# Patient Record
Sex: FEMALE | Race: BLACK OR AFRICAN AMERICAN | Employment: FULL TIME | URBAN - METROPOLITAN AREA
[De-identification: names, ages, dates, MRNs, and addresses within clinical notes are randomized per-mention and may not be internally consistent; named-entity substitution may affect disease eponyms.]

---

## 2019-01-04 ENCOUNTER — OFFICE VISIT (OUTPATIENT)
Dept: INTERNAL MEDICINE CLINIC | Age: 44
End: 2019-01-04

## 2019-01-04 VITALS
BODY MASS INDEX: 50.02 KG/M2 | OXYGEN SATURATION: 97 % | RESPIRATION RATE: 18 BRPM | DIASTOLIC BLOOD PRESSURE: 65 MMHG | HEIGHT: 64 IN | HEART RATE: 90 BPM | SYSTOLIC BLOOD PRESSURE: 96 MMHG | WEIGHT: 293 LBS | TEMPERATURE: 98.5 F

## 2019-01-04 DIAGNOSIS — E28.2 PCOS (POLYCYSTIC OVARIAN SYNDROME): ICD-10-CM

## 2019-01-04 DIAGNOSIS — E78.5 HYPERLIPIDEMIA, UNSPECIFIED HYPERLIPIDEMIA TYPE: ICD-10-CM

## 2019-01-04 DIAGNOSIS — R73.01 IFG (IMPAIRED FASTING GLUCOSE): ICD-10-CM

## 2019-01-04 DIAGNOSIS — I10 ESSENTIAL HYPERTENSION: Primary | ICD-10-CM

## 2019-01-04 NOTE — PATIENT INSTRUCTIONS
DASH Diet: Care Instructions  Your Care Instructions    The DASH diet is an eating plan that can help lower your blood pressure. DASH stands for Dietary Approaches to Stop Hypertension. Hypertension is high blood pressure. The DASH diet focuses on eating foods that are high in calcium, potassium, and magnesium. These nutrients can lower blood pressure. The foods that are highest in these nutrients are fruits, vegetables, low-fat dairy products, nuts, seeds, and legumes. But taking calcium, potassium, and magnesium supplements instead of eating foods that are high in those nutrients does not have the same effect. The DASH diet also includes whole grains, fish, and poultry. The DASH diet is one of several lifestyle changes your doctor may recommend to lower your high blood pressure. Your doctor may also want you to decrease the amount of sodium in your diet. Lowering sodium while following the DASH diet can lower blood pressure even further than just the DASH diet alone. Follow-up care is a key part of your treatment and safety. Be sure to make and go to all appointments, and call your doctor if you are having problems. It's also a good idea to know your test results and keep a list of the medicines you take. How can you care for yourself at home? Following the DASH diet  · Eat 4 to 5 servings of fruit each day. A serving is 1 medium-sized piece of fruit, ½ cup chopped or canned fruit, 1/4 cup dried fruit, or 4 ounces (½ cup) of fruit juice. Choose fruit more often than fruit juice. · Eat 4 to 5 servings of vegetables each day. A serving is 1 cup of lettuce or raw leafy vegetables, ½ cup of chopped or cooked vegetables, or 4 ounces (½ cup) of vegetable juice. Choose vegetables more often than vegetable juice. · Get 2 to 3 servings of low-fat and fat-free dairy each day. A serving is 8 ounces of milk, 1 cup of yogurt, or 1 ½ ounces of cheese. · Eat 6 to 8 servings of grains each day.  A serving is 1 slice of bread, 1 ounce of dry cereal, or ½ cup of cooked rice, pasta, or cooked cereal. Try to choose whole-grain products as much as possible. · Limit lean meat, poultry, and fish to 2 servings each day. A serving is 3 ounces, about the size of a deck of cards. · Eat 4 to 5 servings of nuts, seeds, and legumes (cooked dried beans, lentils, and split peas) each week. A serving is 1/3 cup of nuts, 2 tablespoons of seeds, or ½ cup of cooked beans or peas. · Limit fats and oils to 2 to 3 servings each day. A serving is 1 teaspoon of vegetable oil or 2 tablespoons of salad dressing. · Limit sweets and added sugars to 5 servings or less a week. A serving is 1 tablespoon jelly or jam, ½ cup sorbet, or 1 cup of lemonade. · Eat less than 2,300 milligrams (mg) of sodium a day. If you limit your sodium to 1,500 mg a day, you can lower your blood pressure even more. Tips for success  · Start small. Do not try to make dramatic changes to your diet all at once. You might feel that you are missing out on your favorite foods and then be more likely to not follow the plan. Make small changes, and stick with them. Once those changes become habit, add a few more changes. · Try some of the following:  ? Make it a goal to eat a fruit or vegetable at every meal and at snacks. This will make it easy to get the recommended amount of fruits and vegetables each day. ? Try yogurt topped with fruit and nuts for a snack or healthy dessert. ? Add lettuce, tomato, cucumber, and onion to sandwiches. ? Combine a ready-made pizza crust with low-fat mozzarella cheese and lots of vegetable toppings. Try using tomatoes, squash, spinach, broccoli, carrots, cauliflower, and onions. ? Have a variety of cut-up vegetables with a low-fat dip as an appetizer instead of chips and dip. ? Sprinkle sunflower seeds or chopped almonds over salads. Or try adding chopped walnuts or almonds to cooked vegetables.   ? Try some vegetarian meals using beans and peas. Add garbanzo or kidney beans to salads. Make burritos and tacos with mashed perez beans or black beans. Where can you learn more? Go to http://edinson-willy.info/. Enter R029 in the search box to learn more about \"DASH Diet: Care Instructions. \"  Current as of: December 6, 2017  Content Version: 11.8  © 6179-4939 [employer] Care instructions adapted under license by [employer] (which disclaims liability or warranty for this information). If you have questions about a medical condition or this instruction, always ask your healthcare professional. [address] any warranty or liability for your use of this information.

## 2019-01-04 NOTE — PROGRESS NOTES
Subjective:   Patient is a 37y.o. year old female who presents for Hypertension and PCOS  Establishing care. 1.  HTN:  On Lotrel daily. Has been on that for a few years. 2.  Hyperlipidemia:  Was on simvastatin in the past and caused palpitations. 3.  Prediabetes:  Has been on metformin 500 mg daily. She's been on it for PCOS and metformin. 4.  Sensory changes in the hands and feet. Recently. She has gotten that over the last few years but worse over the holidays. Worried about her sugar. Wanted to find out if she has diabetes or worsening sugars. Doing a little better now. 5. PCOS:  Was seeing Ob/Gyn for that in the past.  One concern is excessive hair growth and has to shave for that.    6.  H/O fibroids: On norethindrone and doing well with that. Doesn't want to get surgery. Review of Systems   Constitutional: Negative. Respiratory: Negative. Cardiovascular: Negative. Skin:        Hair growth   Neurological: Positive for sensory change. All other systems reviewed and are negative. Current Outpatient Medications on File Prior to Visit   Medication Sig Dispense Refill    amLODIPine-benazepril (LOTREL) 10-20 mg per capsule TK 1 C PO QD  5    Cholecalciferol, Vitamin D3, 50,000 unit cap TK 1 C PO 1 TIME PER WEEK.  5    famotidine (PEPCID) 20 mg tablet TK 1 T PO Q 12 H  0    fluticasone (FLONASE) 50 mcg/actuation nasal spray SHAKE LQ AND U 1 SPR IEN QD  4    norethindrone acetate (AYGESTIN) 5 mg tablet TK 1 T PO BID  5    aspirin 81 mg tablet Take 81 mg by mouth.  metformin ER (GLUCOPHAGE XR) 750 mg tablet Take 500 mg by mouth daily. No current facility-administered medications on file prior to visit. Reviewed PmHx, RxHx, FmHx, SocHx, AllgHx and updated and dated in the chart.     Nurse notes were reviewed and are correct    Objective:     Vitals:    01/04/19 1603   BP: 96/65   Pulse: 90   Resp: 18   Temp: 98.5 °F (36.9 °C)   TempSrc: Oral SpO2: 97%   Weight: 312 lb (141.5 kg)   Height: 5' 4\" (1.626 m)     Physical Exam   Constitutional: She is oriented to person, place, and time. She appears well-developed and well-nourished. No distress. HENT:   Head: Normocephalic and atraumatic. Right Ear: External ear normal.   Left Ear: External ear normal.   Nose: Nose normal.   Mouth/Throat: Oropharynx is clear and moist.   Eyes: Conjunctivae and EOM are normal. Pupils are equal, round, and reactive to light. No scleral icterus. Neck: Normal range of motion. Neck supple. No tracheal deviation present. No thyromegaly present. Cardiovascular: Normal rate, regular rhythm, normal heart sounds and intact distal pulses. Exam reveals no gallop and no friction rub. No murmur heard. Pulmonary/Chest: Effort normal and breath sounds normal. She has no wheezes. She has no rales. Abdominal: Soft. Bowel sounds are normal. She exhibits no distension. There is no hepatosplenomegaly. There is no tenderness. Musculoskeletal: Normal range of motion. She exhibits no edema or tenderness. Lymphadenopathy:     She has no cervical adenopathy. Neurological: She is alert and oriented to person, place, and time. Skin: Skin is warm and dry. No rash noted. No pallor. Psychiatric: She has a normal mood and affect. Her behavior is normal. Judgment and thought content normal.   Nursing note and vitals reviewed. Assessment/ Plan:     Diagnoses and all orders for this visit:    1. Essential hypertension:  Well controlled. Continue current meds. 2. PCOS (polycystic ovarian syndrome): Will go back to see Ob/Gyn to discuss options for this. 3. IFG (impaired fasting glucose): She will come back next week and get a CPE with fasting labs. 4. Hyperlipidemia, unspecified hyperlipidemia type:  Can't take Zocor. Will check lipids with CPE       I have discussed the diagnosis with the patient and the intended plan as seen in the above orders.   The patient verbalized understanding and agrees with the plan. Follow-up Disposition:  Return in about 4 days (around 1/8/2019) for CPE, fasting labs.     Molly Amaro MD

## 2019-01-06 PROBLEM — E66.01 OBESITY, MORBID (HCC): Status: ACTIVE | Noted: 2019-01-06

## 2019-01-06 PROBLEM — E78.5 HYPERLIPIDEMIA: Status: ACTIVE | Noted: 2019-01-06

## 2019-01-08 ENCOUNTER — OFFICE VISIT (OUTPATIENT)
Dept: INTERNAL MEDICINE CLINIC | Age: 44
End: 2019-01-08

## 2019-01-08 VITALS
SYSTOLIC BLOOD PRESSURE: 93 MMHG | RESPIRATION RATE: 18 BRPM | TEMPERATURE: 98.6 F | BODY MASS INDEX: 50.02 KG/M2 | WEIGHT: 293 LBS | HEART RATE: 90 BPM | DIASTOLIC BLOOD PRESSURE: 63 MMHG | OXYGEN SATURATION: 98 % | HEIGHT: 64 IN

## 2019-01-08 DIAGNOSIS — I10 ESSENTIAL HYPERTENSION: ICD-10-CM

## 2019-01-08 DIAGNOSIS — R73.03 PREDIABETES: ICD-10-CM

## 2019-01-08 DIAGNOSIS — Z12.39 SCREENING FOR BREAST CANCER: ICD-10-CM

## 2019-01-08 DIAGNOSIS — Z00.00 ROUTINE GENERAL MEDICAL EXAMINATION AT A HEALTH CARE FACILITY: Primary | ICD-10-CM

## 2019-01-08 RX ORDER — AMLODIPINE AND BENAZEPRIL HYDROCHLORIDE 10; 20 MG/1; MG/1
1 CAPSULE ORAL DAILY
Qty: 90 CAP | Refills: 1 | Status: SHIPPED | OUTPATIENT
Start: 2019-01-08 | End: 2019-08-06 | Stop reason: SDUPTHER

## 2019-01-08 NOTE — PROGRESS NOTES
Chief Complaint Patient presents with  Physical  
  with labs 1. Have you been to the ER, urgent care clinic since your last visit? Hospitalized since your last visit? No 
 
2. Have you seen or consulted any other health care providers outside of the [address] [place] [address] since your last visit? Include any pap smears or colon screening.  No

## 2019-01-08 NOTE — PATIENT INSTRUCTIONS

## 2019-01-08 NOTE — PROGRESS NOTES
Subjective:  
Patient is a 37y.o. year old female who presents for Physical (with labs) CPE:  Fasting today. Has h/o chiari malformation. Has seen neuro. Saw Dr. Emmie Fuller in the past for this. Sore on left breast:  She has had to shave the top of her breasts because of hair growth. She noticed a sore over the weekend that looked like it was infected. She put topical abx on it and it's gotten better but she would like it checked out. Review of Systems Constitutional: Negative. HENT: Positive for congestion (uses flonase). Eyes: Negative. Respiratory:  
     Diagnosed with sleep apnea last year. Hasn't gotten CPAP yet Cardiovascular:  
     Has heart murmur. Has had stress test a few years ago. Was normal  
Gastrointestinal: Positive for heartburn. Hernia:  Diagnosed last year. Had CT in the ED. No symptoms Genitourinary:  
     PCOS Musculoskeletal: Negative. Skin:  
     Dry spots all over. Possible eczema. Had it for a few years. On back of neck, arms, legs, and face Has some issues on the left and right breasts. The left has a sore over the weekend. Has been shaving that area due to PCOS Neurological: Positive for sensory change. Psychiatric/Behavioral: The patient is nervous/anxious. HM:  Do for mammogram.  Wants to do it at [facility] 
 
Current Outpatient Medications on File Prior to Visit Medication Sig Dispense Refill  Cholecalciferol, Vitamin D3, 50,000 unit cap TK 1 C PO 1 TIME PER WEEK.  5  
 famotidine (PEPCID) 20 mg tablet TK 1 T PO Q 12 H  0  
 fluticasone (FLONASE) 50 mcg/actuation nasal spray SHAKE LQ AND U 1 SPR IEN QD  4  
 norethindrone acetate (AYGESTIN) 5 mg tablet TK 1 T PO BID  5  
 aspirin 81 mg tablet Take 81 mg by mouth.  metformin ER (GLUCOPHAGE XR) 750 mg tablet Take 500 mg by mouth daily. No current facility-administered medications on file prior to visit. Reviewed PmHx, RxHx, FmHx, SocHx, AllgHx and updated and dated in the chart. Nurse notes were reviewed and are correct Objective:  
 
Vitals:  
 01/08/19 4988 BP: 93/63 Pulse: 90 Resp: 18 Temp: 98.6 °F (37 °C) TempSrc: Oral  
SpO2: 98% Weight: 310 lb (140.6 kg) Height: 5' 4\" (1.626 m) Physical Exam  
Constitutional: She appears well-developed and well-nourished. No distress. HENT:  
Head: Normocephalic and atraumatic. Right Ear: External ear normal.  
Left Ear: External ear normal.  
Nose: Nose normal.  
Mouth/Throat: Oropharynx is clear and moist.  
Eyes: Conjunctivae are normal. Pupils are equal, round, and reactive to light. No scleral icterus. Neck: Normal range of motion. Neck supple. Carotid bruit is not present. No tracheal deviation present. No thyromegaly present. Cardiovascular: Normal rate, regular rhythm, normal heart sounds and intact distal pulses. Exam reveals no gallop and no friction rub. No murmur heard. Pulmonary/Chest: Effort normal and breath sounds normal. She has no wheezes. She has no rales. Right breast exhibits no inverted nipple, no mass, no nipple discharge and no tenderness. Left breast exhibits no inverted nipple, no mass, no nipple discharge and no tenderness. Abdominal: Soft. Bowel sounds are normal. She exhibits no distension. There is no hepatosplenomegaly. There is no tenderness. Musculoskeletal: Normal range of motion. She exhibits no edema or tenderness. Lymphadenopathy:  
  She has no cervical adenopathy. Skin: Skin is warm and dry. No rash noted. No pallor. Psychiatric: She has a normal mood and affect. Judgment normal.  
Nursing note and vitals reviewed. Assessment/ Plan:  
 
Diagnoses and all orders for this visit: 1. Routine general medical examination at a health care facility -     METABOLIC PANEL, COMPREHENSIVE; Future -     CBC WITH AUTOMATED DIFF; Future -     LIPID PANEL; Future -     URINALYSIS W/ RFLX MICROSCOPIC; Future 
-     TSH 3RD GENERATION; Future 
-     HEMOGLOBIN A1C WITH EAG; Future 2. Essential hypertension 
-     amLODIPine-benazepril (LOTREL) 10-20 mg per capsule; Take 1 Cap by mouth daily. 3. Prediabetes 
-     HEMOGLOBIN A1C WITH EAG; Future 4. Screening for breast cancer:  She's due for screening mammogram.  Will go ahead and order. Exam was neg except for the healing sore. I think it was from skin damage from shaving that got infected. -     SINCERE MAMMO BI SCREENING INCL CAD; Future I have discussed the diagnosis with the patient and the intended plan as seen in the above orders. The patient verbalized understanding and agrees with the plan. Follow-up Disposition: 
Return in about 6 months (around 7/8/2019) for HTN, hyperlipidemis.  
 
Patel Padilla MD

## 2019-01-09 LAB
ABSOLUTE BANDS, 67058: ABNORMAL
ABSOLUTE BLASTS: ABNORMAL
ABSOLUTE METAMYELOCYTES, 900360: ABNORMAL
ABSOLUTE MYELOCYTES: ABNORMAL
ABSOLUTE NRBC,ANRBC: ABNORMAL
ABSOLUTE PROMYELOCYTES: ABNORMAL
ALB/GLOBRATIO, 58C: 1.3 (CALC) (ref 1–2.5)
ALBUMIN SERPL-MCNC: 4.4 G/DL (ref 3.6–5.1)
ALP SERPL-CCNC: 86 U/L (ref 33–115)
ALT SERPL-CCNC: 23 U/L (ref 6–29)
AMORPHOUS SEDIMENT: ABNORMAL
APPEARANCE UR: ABNORMAL
AST SERPL W P-5'-P-CCNC: 20 U/L (ref 10–30)
BACTERIA,BACTU: ABNORMAL
BANDS,BANDS: ABNORMAL
BASOPHILS # BLD: 71 CELLS/UL (ref 0–200)
BASOPHILS NFR BLD: 0.8 %
BILIRUB SERPL-MCNC: 0.5 MG/DL (ref 0.2–1.2)
BILIRUB UR QL: NEGATIVE
BILIRUB UR QL: NEGATIVE
BLASTS,BLAST: ABNORMAL
BUN SERPL-MCNC: 9 MG/DL (ref 7–25)
BUN/CREATININE RATIO,BUCR: NORMAL (CALC) (ref 6–22)
CA OXALATE CRYSTALS,CAOXC: ABNORMAL
CALCIUM SERPL-MCNC: 9.8 MG/DL (ref 8.6–10.2)
CASTS,URINE,CSTS: ABNORMAL
CHLORIDE SERPL-SCNC: 103 MMOL/L (ref 98–110)
CHOL/HDL RATIO,CHHDX: 4 (CALC)
CHOLEST SERPL-MCNC: 167 MG/DL
CO2 SERPL-SCNC: 22 MMOL/L (ref 20–32)
COLOR UR: YELLOW
COMMENT(S): ABNORMAL
COMMENT, 25003510: ABNORMAL
COMMENT, 35578784: ABNORMAL
CREAT SERPL-MCNC: 0.78 MG/DL (ref 0.5–1.1)
CRYSTALS,UCRY: ABNORMAL
EAG (MG/DL),9916804: 126 (CALC)
EAG (MMOL/L),9916805: 7 (CALC)
EOSINOPHIL # BLD: 169 CELLS/UL (ref 15–500)
EOSINOPHIL NFR BLD: 1.9 %
ERYTHROCYTE [DISTWIDTH] IN BLOOD BY AUTOMATED COUNT: 13.6 % (ref 11–15)
GLOBULIN,GLOB: 3.4 G/DL (CALC) (ref 1.9–3.7)
GLUCOSE SERPL-MCNC: 88 MG/DL (ref 65–99)
GRANULAR CAST,GRCST: ABNORMAL
HBA1C MFR BLD HPLC: 6 % OF TOTAL HGB
HCT VFR BLD AUTO: 43.1 % (ref 35–45)
HDLC SERPL-MCNC: 42 MG/DL
HGB BLD-MCNC: 14.5 G/DL (ref 11.7–15.5)
HGB UR QL STRIP: NEGATIVE
HYALINE CAST,HYCST: ABNORMAL
KETONES UR QL STRIP.AUTO: NEGATIVE
LDL-CHOLESTEROL: 111 MG/DL (CALC)
LEUKOCYTE ESTERASE: NEGATIVE
LYMPHOCYTES # BLD: 2323 CELLS/UL (ref 850–3900)
LYMPHOCYTES NFR BLD: 26.1 %
MCH RBC QN AUTO: 27.8 PG (ref 27–33)
MCHC RBC AUTO-ENTMCNC: 33.6 G/DL (ref 32–36)
MCV RBC AUTO: 82.7 FL (ref 80–100)
METAMYELOCYTES,METAS: ABNORMAL
MONOCYTES # BLD: 552 CELLS/UL (ref 200–950)
MONOCYTES NFR BLD: 6.2 %
MYELOCYTES,MYELO: ABNORMAL
NEUTROPHILS # BLD AUTO: 5785 CELLS/UL (ref 1500–7800)
NEUTROPHILS # BLD: 65 %
NITRITE UR QL STRIP.AUTO: NEGATIVE
NON-HDL CHOLESTEROL, 011976: 125 MG/DL (CALC)
NRBC: ABNORMAL
PH UR STRIP: 6.5 [PH] (ref 5–8)
PLATELET # BLD AUTO: 187 THOUSAND/UL (ref 140–400)
PMV BLD AUTO: 11.1 FL (ref 7.5–12.5)
POTASSIUM SERPL-SCNC: 4.3 MMOL/L (ref 3.5–5.3)
PROMYELOCYTES,PRO: ABNORMAL
PROT SERPL-MCNC: 7.8 G/DL (ref 6.1–8.1)
PROT UR STRIP-MCNC: NEGATIVE MG/DL
RBC # BLD AUTO: 5.21 MILLION/UL (ref 3.8–5.1)
RBC #/AREA URNS HPF: ABNORMAL /[HPF]
REACTIVE LYMPHS: ABNORMAL
REDUCING SUBSTANCES: ABNORMAL
RENAL EPITHELIAL CELLS, 6131: ABNORMAL
SODIUM SERPL-SCNC: 135 MMOL/L (ref 135–146)
SP GR UR REFRACTOMETRY: 1.02 (ref 1–1.03)
SQUAMOUS EPITHELIAL CELLS: ABNORMAL
TRANSITIONAL EPITHELIAL CELLS, 6132: ABNORMAL
TRIGL SERPL-MCNC: 48 MG/DL (ref ?–150)
TRIPLE PHOS. CRYSTAL,TRIPC: ABNORMAL
TSH SERPL DL<=0.005 MIU/L-ACNC: 1.65 MIU/L
URATE CRY URNS QL MICRO: ABNORMAL
WBC # BLD AUTO: 8.9 THOUSAND/UL (ref 3.8–10.8)
WBC URNS QL MICRO: ABNORMAL
YEAST URINE, 5174: ABNORMAL

## 2019-01-10 NOTE — PROGRESS NOTES
Your blood work looked good. Cholesterol was good at 167. Your A1c was 6.0, in the prediabetes range, not diabetes. Your sugar was normal.  Kidney and liver function normal, thyroid normal.  No anemia.   Let me know if you got this and if you have questions

## 2019-01-21 DIAGNOSIS — N63.0 BREAST LUMP: Primary | ICD-10-CM

## 2019-01-22 DIAGNOSIS — N63.0 BREAST LUMP: ICD-10-CM

## 2019-02-21 ENCOUNTER — OFFICE VISIT (OUTPATIENT)
Dept: INTERNAL MEDICINE CLINIC | Age: 44
End: 2019-02-21

## 2019-02-21 VITALS
RESPIRATION RATE: 18 BRPM | HEIGHT: 64 IN | BODY MASS INDEX: 50.02 KG/M2 | HEART RATE: 98 BPM | WEIGHT: 293 LBS | OXYGEN SATURATION: 95 % | DIASTOLIC BLOOD PRESSURE: 81 MMHG | TEMPERATURE: 98.8 F | SYSTOLIC BLOOD PRESSURE: 122 MMHG

## 2019-02-21 DIAGNOSIS — J40 BRONCHITIS: Primary | ICD-10-CM

## 2019-02-21 RX ORDER — METFORMIN HYDROCHLORIDE 500 MG/1
TABLET, FILM COATED, EXTENDED RELEASE ORAL
COMMUNITY
End: 2019-03-01 | Stop reason: SDUPTHER

## 2019-02-21 RX ORDER — AZITHROMYCIN 250 MG/1
TABLET, FILM COATED ORAL
Qty: 6 TAB | Refills: 0 | Status: SHIPPED | OUTPATIENT
Start: 2019-02-21 | End: 2019-04-03

## 2019-02-21 RX ORDER — PROMETHAZINE HYDROCHLORIDE AND CODEINE PHOSPHATE 6.25; 1 MG/5ML; MG/5ML
1 SOLUTION ORAL
Qty: 180 ML | Refills: 0 | Status: SHIPPED | OUTPATIENT
Start: 2019-02-21 | End: 2019-04-03

## 2019-02-21 RX ORDER — ALBUTEROL SULFATE 90 UG/1
2 AEROSOL, METERED RESPIRATORY (INHALATION)
Qty: 1 INHALER | Refills: 0 | Status: SHIPPED | OUTPATIENT
Start: 2019-02-21 | End: 2019-04-03

## 2019-02-21 NOTE — PROGRESS NOTES
Chief Complaint Patient presents with  Cold Symptoms Sinus congestion and laryngitis for 2 weeks. 1. Have you been to the ER, urgent care clinic since your last visit? Hospitalized since your last visit? Yes When: [facility] for right ear clogged last week 2. Have you seen or consulted any other health care providers outside of the [address] [place] [address] since your last visit? Include any pap smears or colon screening. No  
3 most recent PHQ Screens 2/21/2019 Little interest or pleasure in doing things Not at all Feeling down, depressed, irritable, or hopeless Not at all Total Score PHQ 2 0

## 2019-02-22 ENCOUNTER — TELEPHONE (OUTPATIENT)
Dept: INTERNAL MEDICINE CLINIC | Age: 44
End: 2019-02-22

## 2019-02-22 DIAGNOSIS — N63.0 LUMP, BREAST: ICD-10-CM

## 2019-02-22 DIAGNOSIS — N63.0 LUMP, BREAST: Primary | ICD-10-CM

## 2019-02-24 NOTE — PATIENT INSTRUCTIONS
Learning About Chronic Bronchitis What is chronic bronchitis? Chronic bronchitis is long-term swelling and the buildup of mucus in the airways of your lungs. The airways (bronchial tubes) get inflamed and make a lot of mucus. This can narrow or block the airways, making it hard for you to breathe. It is a form of COPD (chronic obstructive pulmonary disease). Chronic bronchitis is usually caused by smoking. But chemical fumes, dust, or air pollution also can cause it over time. What can you expect when you have chronic bronchitis? Chronic bronchitis gets worse over time. You cannot undo the damage to your lungs. Over time, you may find that: 
· You get short of breath even when you do simple things like get dressed or fix a meal. 
· It is hard to eat or exercise. · You lose weight and feel weaker. Over many years, the swelling and mucus from chronic bronchitis make it more likely that you will get lung infections. But there are things you can do to prevent more damage and feel better. What are the symptoms? The main symptoms of chronic bronchitis are: · A cough that will not go away. · Mucus that comes up when you cough. · Shortness of breath that gets worse when you exercise. At times, your symptoms may suddenly flare up and get much worse. This is a called an exacerbation (say \"egg-ZAVICKIE-er-BAY-shun\"). When this happens, your usual symptoms quickly get worse and stay bad. This can be dangerous. You may have to go to the hospital. 
How can you keep chronic bronchitis from getting worse? Don't smoke. That is the best way to keep chronic bronchitis from getting worse. If you already smoke, it is never too late to stop. If you need help quitting, talk to your doctor about stop-smoking programs and medicines. These can increase your chances of quitting for good. You can do other things to keep chronic bronchitis from getting worse: · Avoid bad air. Air pollution, chemical fumes, and dust also can make chronic bronchitis worse. · Get a flu shot every year. A shot may keep the flu from turning into something more serious, like pneumonia. A flu shot also may lower your chances of having a flare-up. · Get a pneumococcal shot. A shot can prevent some of the serious complications of pneumonia. Ask your doctor how often you should get this shot. How is chronic bronchitis treated? Chronic bronchitis is treated with medicines and oxygen. You also can take steps at home to stay healthy and keep your condition from getting worse. Medicines and oxygen therapy · You may be taking medicines such as: 
? Bronchodilators. These help open your airways and make breathing easier. Bronchodilators are either short-acting (work for 6 to 9 hours) or long-acting (work for 24 hours). You inhale most bronchodilators, so they start to act quickly. Always carry your quick-relief inhaler with you in case you need it while you are away from home. ? Corticosteroids. These reduce airway inflammation. They come in pill or inhaled form. You must take these medicines every day for them to work well. ? Antibiotics. These medicines are used when you have a bacterial lung infection. · Take your medicines exactly as prescribed. Call your doctor if you think you are having a problem with your medicine. · Oxygen therapy boosts the amount of oxygen in your blood and helps you breathe easier. Use the flow rate your doctor has recommended, and do not change it without talking to your doctor first. 
Other care at home · If your doctor recommends it, get more exercise. Walking is a good choice. Bit by bit, increase the amount you walk every day. Try for at least 30 minutes on most days of the week. · Learn breathing methodssuch as breathing through pursed lipsto help you become less short of breath.  
· If your doctor has not set you up with a pulmonary rehabilitation program, talk to him or her about whether rehab is right for you. Rehab includes exercise programs, education about your disease and how to manage it, help with diet and other changes, and emotional support. · Eat regular, healthy meals. Use bronchodilators about 1 hour before you eat to make it easier to eat. Eat several small meals instead of three large ones. Drink beverages at the end of the meal. Avoid foods that are hard to chew. Follow-up care is a key part of your treatment and safety. Be sure to make and go to all appointments, and call your doctor if you are having problems. It's also a good idea to know your test results and keep a list of the medicines you take. Where can you learn more? Go to http://edinson-willy.info/. Enter I419 in the search box to learn more about \"Learning About Chronic Bronchitis. \" Current as of: September 5, 2018 Content Version: 11.9 © 7493-7594 [employer]. Care instructions adapted under license by [employer] (which disclaims liability or warranty for this information). If you have questions about a medical condition or this instruction, always ask your healthcare professional. [place] [address] any warranty or liability for your use of this information.

## 2019-03-01 NOTE — TELEPHONE ENCOUNTER
Patient called requesting refill on norethindrone 5mg and metformin 500mg  Patient last appointment was 01/08/2019  Patient next appointment is no further appointment   Pharmacy patient wants refill to go to is [facility] on [address]

## 2019-03-02 NOTE — TELEPHONE ENCOUNTER
These have never been prescribed by us. These must have been prescribed by Ob/Gyn. I thought she was going to get an Ob/Gyn. I don't feel comfortable giving the norethindrone because that's not something I prescribe. I can give metformin but need to know the dose. Is it really the glumetza? That's expensive. Can she have generic metformin XR? How much? Just one tab daily?

## 2019-03-04 ENCOUNTER — TELEPHONE (OUTPATIENT)
Dept: INTERNAL MEDICINE CLINIC | Age: 44
End: 2019-03-04

## 2019-03-04 RX ORDER — METFORMIN HYDROCHLORIDE 500 MG/1
500 TABLET, FILM COATED, EXTENDED RELEASE ORAL DAILY
Qty: 30 TAB | Refills: 0 | Status: SHIPPED | OUTPATIENT
Start: 2019-03-04 | End: 2020-01-22 | Stop reason: DRUGHIGH

## 2019-03-04 RX ORDER — NORETHINDRONE 5 MG/1
TABLET ORAL
Qty: 60 TAB | Refills: 0 | Status: SHIPPED | OUTPATIENT
Start: 2019-03-04

## 2019-03-04 NOTE — TELEPHONE ENCOUNTER
Pt called in wondering about her medication request from 03/01/19.  Please call pt back at [phone]

## 2019-03-29 ENCOUNTER — OFFICE VISIT (OUTPATIENT)
Dept: INTERNAL MEDICINE CLINIC | Age: 44
End: 2019-03-29

## 2019-03-29 VITALS
TEMPERATURE: 97.9 F | HEART RATE: 104 BPM | OXYGEN SATURATION: 94 % | RESPIRATION RATE: 22 BRPM | BODY MASS INDEX: 50.02 KG/M2 | SYSTOLIC BLOOD PRESSURE: 121 MMHG | HEIGHT: 64 IN | DIASTOLIC BLOOD PRESSURE: 76 MMHG | WEIGHT: 293 LBS

## 2019-03-29 DIAGNOSIS — R10.9 FLANK PAIN: Primary | ICD-10-CM

## 2019-03-29 DIAGNOSIS — L20.82 FLEXURAL ECZEMA: ICD-10-CM

## 2019-03-29 DIAGNOSIS — R10.13 DYSPEPSIA: ICD-10-CM

## 2019-03-29 LAB
BILIRUB UR QL STRIP: NEGATIVE
GLUCOSE UR-MCNC: NEGATIVE MG/DL
KETONES P FAST UR STRIP-MCNC: NEGATIVE MG/DL
PH UR STRIP: 7 [PH] (ref 4.6–8)
PROT UR QL STRIP: NEGATIVE
SP GR UR STRIP: 1.03 (ref 1–1.03)
UA UROBILINOGEN AMB POC: NORMAL (ref 0.2–1)
URINALYSIS CLARITY POC: CLEAR
URINALYSIS COLOR POC: YELLOW
URINE BLOOD POC: NORMAL
URINE LEUKOCYTES POC: NEGATIVE
URINE NITRITES POC: NEGATIVE

## 2019-03-29 RX ORDER — OMEPRAZOLE 40 MG/1
40 CAPSULE, DELAYED RELEASE ORAL DAILY
Qty: 30 CAP | Refills: 2 | Status: SHIPPED | OUTPATIENT
Start: 2019-03-29 | End: 2019-07-09 | Stop reason: SDUPTHER

## 2019-03-29 RX ORDER — TRIAMCINOLONE ACETONIDE 1 MG/G
OINTMENT TOPICAL 2 TIMES DAILY
Qty: 30 G | Refills: 0 | Status: SHIPPED | OUTPATIENT
Start: 2019-03-29 | End: 2020-01-22

## 2019-03-29 RX ORDER — SUCRALFATE 1 G/10ML
1 SUSPENSION ORAL
Qty: 414 ML | Refills: 2 | Status: SHIPPED | OUTPATIENT
Start: 2019-03-29 | End: 2020-01-22

## 2019-03-29 NOTE — PROGRESS NOTES
Subjective:  
Patient is a 37y.o. year old female who presents for Chronic Kidney Disease Burning in her abdomen for 10 days. Taking Tums, Zantac. Now the pain is radiating to the back. Tums and Zantac helped at first but now not helping. Is under stress but always stressed because of job. Normally takes baby ASA daily but doesn't do that every day. She has h/o PUD. Went to [facility] last year and diagnosed with ulcer. Eczema:  Saw dermatology. Was put on a topical medicine:  Lac Hydrin. Not a steroid. Then recently developed itching, erythema in the antecubital fossae. Review of Systems Constitutional: Negative. Cardiovascular: Negative. Current Outpatient Medications on File Prior to Visit Medication Sig Dispense Refill  metFORMIN (GLUMETZA ER) 500 mg TG24 24 hour tablet Take 500 mg by mouth daily. 30 Tab 0  
 amLODIPine-benazepril (LOTREL) 10-20 mg per capsule Take 1 Cap by mouth daily. 90 Cap 1  Cholecalciferol, Vitamin D3, 50,000 unit cap TK 1 C PO 1 TIME PER WEEK.  5  
 fluticasone (FLONASE) 50 mcg/actuation nasal spray SHAKE LQ AND U 1 SPR IEN QD  4  
 aspirin 81 mg tablet Take 81 mg by mouth.  norethindrone acetate (AYGESTIN) 5 mg tablet TK 1 T PO BID 60 Tab 0  
 azithromycin (ZITHROMAX Z-AMIRAH) 250 mg tablet Take two tablets today then one tablet daily 6 Tab 0  
 albuterol (PROVENTIL HFA, VENTOLIN HFA, PROAIR HFA) 90 mcg/actuation inhaler Take 2 Puffs by inhalation every four (4) hours as needed for Wheezing. 1 Inhaler 0  
 promethazine-codeine (PHENERGAN WITH CODEINE) 6.25-10 mg/5 mL syrup Take 5 mL by mouth every six (6) hours as needed for Cough. Max Daily Amount: 20 mL. 180 mL 0  
 metformin ER (GLUCOPHAGE XR) 750 mg tablet Take 500 mg by mouth daily. No current facility-administered medications on file prior to visit. Reviewed PmHx, RxHx, FmHx, SocHx, AllgHx and updated and dated in the chart. Nurse notes were reviewed and are correct Objective:  
 
Vitals:  
 03/29/19 1505 BP: 121/76 Pulse: (!) 104 Resp: 22 Temp: 97.9 °F (36.6 °C) TempSrc: Oral  
SpO2: 94% Weight: 316 lb (143.3 kg) Height: 5' 4\" (1.626 m) Physical Exam  
Constitutional: She is oriented to person, place, and time. She appears well-developed and well-nourished. No distress. HENT:  
Head: Normocephalic and atraumatic. Eyes: Conjunctivae and EOM are normal.  
Neck: Normal range of motion. Neck supple. Cardiovascular: Normal rate, regular rhythm, normal heart sounds and intact distal pulses. Exam reveals no gallop and no friction rub. No murmur heard. Pulmonary/Chest: Effort normal and breath sounds normal. No respiratory distress. Abdominal: Soft. Bowel sounds are normal. She exhibits no distension. There is no tenderness. Tenderness in mid-epigastric area. Some mild LLQ tenderness as well. Musculoskeletal: Normal range of motion. She exhibits no edema. Lymphadenopathy:  
  She has no cervical adenopathy. Neurological: She is alert and oriented to person, place, and time. Skin: Skin is warm and dry. No rash noted. She is not diaphoretic. No erythema. No pallor. Antecubital fossae:  Rash bilaterally with darkening of skin in the skin crease. Psychiatric: She has a normal mood and affect. Her behavior is normal. Judgment and thought content normal.  
Nursing note and vitals reviewed. Assessment/ Plan:  
 
Diagnoses and all orders for this visit: 1. Flank pain:  U/A neg. I think the back pain is musculoskeletal. 
-     AMB POC URINALYSIS DIP STICK AUTO W/O MICRO 2. Dyspepsia: Will take daily prilosec for this and prn carafate if needed. If not getting better will send to GI 
-     sucralfate (CARAFATE) 100 mg/mL suspension; Take 10 mL by mouth four (4) times daily as needed (stomach pain). -     omeprazole (PRILOSEC) 40 mg capsule; Take 1 Cap by mouth daily. 3. Flexural eczema -     triamcinolone acetonide (KENALOG) 0.1 % ointment; Apply  to affected area two (2) times a day. Max 2 weeks at a time I have discussed the diagnosis with the patient and the intended plan as seen in the above orders. The patient verbalized understanding and agrees with the plan. Follow-up and Dispositions · Return in about 1 month (around 4/29/2019) for abdominal pain, blood pressure:  30 min.  
  
 
 
John Durham MD

## 2019-03-29 NOTE — PROGRESS NOTES
Deyanira Cmaacho is a 37 y.o. female (: 1975) presenting to address: Chief Complaint Patient presents with  Chronic Kidney Disease Denies dysuria. Has PMHx of UIT. States this doesn't feel like it has in the past.  Back ache x 10 days. Burning in stomach. Taking OTC antacids and Zantac. Helped at first, but is not any longer. Medication list and allergies have been reviewed with Terrence Garza and updated as of today's date. I have gone over all Medical, Surgical and Social History with Terrence Garza and updated/added the information accordingly. 1. Have you been to the ER, Urgent Care or Hospitalized since your last visit? NO 
 
 
2. Have you followed up with your PCP or any other Physicians since your procedure/ last office visit? NO 
 
3 most recent PHQ Screens 3/29/2019 Little interest or pleasure in doing things Not at all Feeling down, depressed, irritable, or hopeless Not at all Total Score PHQ 2 0 VORB: No orders of the defined types were placed in this encounter. 
Neha Feng MD/Alejandro Koroma

## 2019-03-29 NOTE — PATIENT INSTRUCTIONS
1.  Goal blood pressure:  Less than 140/90. 
2.  Take the omeprazole daily. 3.  Take the carafate liquid as needed.

## 2019-04-09 PROBLEM — N39.41 URGE INCONTINENCE OF URINE: Status: ACTIVE | Noted: 2019-04-09

## 2019-04-09 PROBLEM — N92.1 METRORRHAGIA: Status: ACTIVE | Noted: 2019-04-09

## 2019-04-09 PROBLEM — N84.0 POLYP OF CORPUS UTERI: Status: ACTIVE | Noted: 2019-04-09

## 2019-04-09 PROBLEM — N92.6 IRREGULAR PERIODS: Status: ACTIVE | Noted: 2019-04-09

## 2019-04-09 PROBLEM — N92.0 MENORRHAGIA: Status: ACTIVE | Noted: 2019-04-09

## 2019-04-09 PROBLEM — N39.3 FEMALE STRESS INCONTINENCE: Status: ACTIVE | Noted: 2019-04-09

## 2019-04-09 PROBLEM — N91.2 AMENORRHEA: Status: ACTIVE | Noted: 2019-04-09

## 2019-04-09 PROBLEM — L68.0 HIRSUTISM: Status: ACTIVE | Noted: 2019-03-19

## 2019-04-09 PROBLEM — N63.0 BREAST LUMP: Status: ACTIVE | Noted: 2019-04-09

## 2019-04-09 PROBLEM — N90.9 DISORDER OF VULVA: Status: ACTIVE | Noted: 2019-04-09

## 2019-04-09 PROBLEM — D64.9 ANEMIA: Status: ACTIVE | Noted: 2019-04-09

## 2019-04-22 ENCOUNTER — OFFICE VISIT (OUTPATIENT)
Dept: INTERNAL MEDICINE CLINIC | Age: 44
End: 2019-04-22

## 2019-04-22 VITALS
TEMPERATURE: 98.1 F | OXYGEN SATURATION: 98 % | BODY MASS INDEX: 50.02 KG/M2 | WEIGHT: 293 LBS | HEIGHT: 64 IN | SYSTOLIC BLOOD PRESSURE: 112 MMHG | HEART RATE: 93 BPM | RESPIRATION RATE: 18 BRPM | DIASTOLIC BLOOD PRESSURE: 64 MMHG

## 2019-04-22 DIAGNOSIS — Z23 ENCOUNTER FOR IMMUNIZATION: ICD-10-CM

## 2019-04-22 DIAGNOSIS — I10 ESSENTIAL HYPERTENSION: ICD-10-CM

## 2019-04-22 DIAGNOSIS — E66.01 OBESITY, MORBID, BMI 50 OR HIGHER (HCC): Primary | ICD-10-CM

## 2019-04-22 DIAGNOSIS — L55.9 SUNBURN: ICD-10-CM

## 2019-04-22 NOTE — PROGRESS NOTES
Subjective:   Patient is a 37y.o. year old female who presents for Flank Pain and Shortness of Breath (follow up)  1. Urinary symptoms:  Went to urology. They did cx and was positive for E-coli and Proteus. Gave Cipro. Didn't resolve. Then started another abx and just finishing that. She was having urinary symptoms of not knowing when needed to urinate. Also having right flank pain. 2.  Uterine fibroids:  Has seen gynecology for this. Talking about surgery either in office or hysterectomy. She is deciding this. 3.  Obesity:  She's eating a bland diet and lost weight. But not able to lose a lot. We have discussed options. She's interested in considering bariatric surgery as well as non-surgical alternatives. 4.  GI:  Is under care of GI doctor. They are worried about GB and ordered HIDA scan. She will be getting that. Getting some epigastric burning. 5.  Sunburn:  Got it in [place]. Is itching, not hurting. 6. MMR:  Asks for booster. Hasn't had one as an adult. Worried about it because of the recent measles outbreaks    7. FH cancer:  Father had pancreatic. Also FH of renal and prostate. Review of Systems   Constitutional: Negative. Cardiovascular: Negative. Current Outpatient Medications on File Prior to Visit   Medication Sig Dispense Refill    ibuprofen (MOTRIN) 800 mg tablet Take 800 mg by mouth.  phenazopyridine (PYRIDIUM) 200 mg tablet Take 1 Tab by mouth three (3) times daily as needed for Pain. 20 Tab 1    ciprofloxacin HCl (CIPRO) 500 mg tablet Take 1 Tab by mouth two (2) times a day. 14 Tab 0    ammonium lactate (LAC-HYDRIN) 12 % lotion ammonium lactate 12 % lotion   LORENZO AA BID PRN      clindamycin (CLEOCIN T) 1 % lotion clindamycin 1 % lotion      eflornithine (VANIQA) 13.9 % topical cream Apply  to affected area.       fluocinoNIDE (LIDEX) 0.05 % ointment fluocinonide 0.05 % topical ointment      sucralfate (CARAFATE) 100 mg/mL suspension Take 10 mL by mouth four (4) times daily as needed (stomach pain). 414 mL 2    omeprazole (PRILOSEC) 40 mg capsule Take 1 Cap by mouth daily. 30 Cap 2    triamcinolone acetonide (KENALOG) 0.1 % ointment Apply  to affected area two (2) times a day. Max 2 weeks at a time 30 g 0    norethindrone acetate (AYGESTIN) 5 mg tablet TK 1 T PO BID 60 Tab 0    metFORMIN (GLUMETZA ER) 500 mg TG24 24 hour tablet Take 500 mg by mouth daily. 30 Tab 0    amLODIPine-benazepril (LOTREL) 10-20 mg per capsule Take 1 Cap by mouth daily. 90 Cap 1    Cholecalciferol, Vitamin D3, 50,000 unit cap TK 1 C PO 1 TIME PER WEEK.  5    fluticasone (FLONASE) 50 mcg/actuation nasal spray SHAKE LQ AND U 1 SPR IEN QD  4    metformin ER (GLUCOPHAGE XR) 750 mg tablet Take 500 mg by mouth daily. No current facility-administered medications on file prior to visit. Reviewed PmHx, RxHx, FmHx, SocHx, AllgHx and updated and dated in the chart. Nurse notes were reviewed and are correct    Objective:     Vitals:    04/22/19 1449   BP: 112/64   Pulse: 93   Resp: 18   Temp: 98.1 °F (36.7 °C)   TempSrc: Oral   SpO2: 98%   Weight: 312 lb (141.5 kg)   Height: 5' 4\" (1.626 m)     Physical Exam   Constitutional: She is oriented to person, place, and time. She appears well-developed and well-nourished. No distress. HENT:   Head: Normocephalic and atraumatic. Right Ear: External ear normal.   Left Ear: External ear normal.   Nose: Nose normal.   Mouth/Throat: Oropharynx is clear and moist.   Eyes: Pupils are equal, round, and reactive to light. EOM are normal.   Neck: Normal range of motion. Neck supple. Carotid bruit is not present. No tracheal deviation present. Cardiovascular: Normal rate, regular rhythm, normal heart sounds and intact distal pulses. Exam reveals no gallop and no friction rub. No murmur heard. Pulmonary/Chest: Effort normal and breath sounds normal. She has no wheezes. She has no rales. Abdominal: Soft.  Bowel sounds are normal. She exhibits no distension. There is no tenderness. Musculoskeletal: She exhibits no edema or tenderness. Lymphadenopathy:     She has no cervical adenopathy. Neurological: She is alert and oriented to person, place, and time. Skin: Skin is warm and dry. Psychiatric: She has a normal mood and affect. Her behavior is normal.   Nursing note and vitals reviewed. Assessment/ Plan:     Diagnoses and all orders for this visit:    1. Obesity, morbid, BMI 50 or higher ([facility] [address]):  Discussed options. Worried about using phentermine because of h/o HTN and on medication. Gave her the [facility] number to call and they can run her insurance to see if the name brand meds are covered. She is also interested in considering bariatric options, so referring to bariatric surgery.  -     REFERRAL TO BARIATRIC SURGERY    2. Essential hypertension:  BP well controlled on current meds    3. Sunburn:  Has some mild skin peeling. I reassured her that this will resolve on it's own but recommended moisturizing lotion to help with itching and told her the color changes should slowly resolve over time    4. Encounter for immunization  -     measles, mumps & rubella Vacc, PF, (M-M-R II) 1,000-12,500 TCID50/0.5 mL solr injection; 0.5 mL by SubCUTAneous route once for 1 dose. I have discussed the diagnosis with the patient and the intended plan as seen in the above orders. The patient verbalized understanding and agrees with the plan. Follow-up and Dispositions    · Return in about 3 months (around 7/22/2019) for abdominal pain, HTN, prediabetes.          Fitz Landa MD

## 2019-04-22 NOTE — PROGRESS NOTES
Chief Complaint   Patient presents with    Flank Pain    Shortness of Breath     follow up   1. Have you been to the ER, urgent care clinic since your last visit? Hospitalized since your last visit? No    2. Have you seen or consulted any other health care providers outside of the [address] [place] [address] since your last visit? Include any pap smears or colon screening.  No

## 2019-06-13 ENCOUNTER — OFFICE VISIT (OUTPATIENT)
Dept: INTERNAL MEDICINE CLINIC | Age: 44
End: 2019-06-13

## 2019-06-13 VITALS
DIASTOLIC BLOOD PRESSURE: 80 MMHG | BODY MASS INDEX: 50.02 KG/M2 | SYSTOLIC BLOOD PRESSURE: 122 MMHG | RESPIRATION RATE: 18 BRPM | OXYGEN SATURATION: 99 % | WEIGHT: 293 LBS | TEMPERATURE: 99.1 F | HEIGHT: 64 IN | HEART RATE: 95 BPM

## 2019-06-13 DIAGNOSIS — M25.562 ACUTE PAIN OF LEFT KNEE: Primary | ICD-10-CM

## 2019-06-13 NOTE — PATIENT INSTRUCTIONS
Knee Pain or Injury: Care Instructions  Your Care Instructions    Injuries are a common cause of knee problems. Sudden (acute) injuries may be caused by a direct blow to the knee. They can also be caused by abnormal twisting, bending, or falling on the knee. Pain, bruising, or swelling may be severe, and may start within minutes of the injury. Overuse is another cause of knee pain. Other causes are climbing stairs, kneeling, and other activities that use the knee. Everyday wear and tear, especially as you get older, also can cause knee pain. Rest, along with home treatment, often relieves pain and allows your knee to heal. If you have a serious knee injury, you may need tests and treatment. Follow-up care is a key part of your treatment and safety. Be sure to make and go to all appointments, and call your doctor if you are having problems. It's also a good idea to know your test results and keep a list of the medicines you take. How can you care for yourself at home? · Be safe with medicines. Read and follow all instructions on the label. ? If the doctor gave you a prescription medicine for pain, take it as prescribed. ? If you are not taking a prescription pain medicine, ask your doctor if you can take an over-the-counter medicine. · Rest and protect your knee. Take a break from any activity that may cause pain. · Put ice or a cold pack on your knee for 10 to 20 minutes at a time. Put a thin cloth between the ice and your skin. · Prop up a sore knee on a pillow when you ice it or anytime you sit or lie down for the next 3 days. Try to keep it above the level of your heart. This will help reduce swelling. · If your knee is not swollen, you can put moist heat, a heating pad, or a warm cloth on your knee. · If your doctor recommends an elastic bandage, sleeve, or other type of support for your knee, wear it as directed.   · Follow your doctor's instructions about how much weight you can put on your leg. Use a cane, crutches, or a walker as instructed. · Follow your doctor's instructions about activity during your healing process. If you can do mild exercise, slowly increase your activity. · Reach and stay at a healthy weight. Extra weight can strain the joints, especially the knees and hips, and make the pain worse. Losing even a few pounds may help. When should you call for help? Call 911 anytime you think you may need emergency care. For example, call if:    · You have symptoms of a blood clot in your lung (called a pulmonary embolism). These may include:  ? Sudden chest pain. ? Trouble breathing. ? Coughing up blood.    Call your doctor now or seek immediate medical care if:    · You have severe or increasing pain.     · Your leg or foot turns cold or changes color.     · You cannot stand or put weight on your knee.     · Your knee looks twisted or bent out of shape.     · You cannot move your knee.     · You have signs of infection, such as:  ? Increased pain, swelling, warmth, or redness. ? Red streaks leading from the knee. ? Pus draining from a place on your knee. ? A fever.     · You have signs of a blood clot in your leg (called a deep vein thrombosis), such as:  ? Pain in your calf, back of the knee, thigh, or groin. ? Redness and swelling in your leg or groin.    Watch closely for changes in your health, and be sure to contact your doctor if:    · You have tingling, weakness, or numbness in your knee.     · You have any new symptoms, such as swelling.     · You have bruises from a knee injury that last longer than 2 weeks.     · You do not get better as expected. Where can you learn more? Go to http://edinson-willy.info/. Enter K195 in the search box to learn more about \"Knee Pain or Injury: Care Instructions. \"  Current as of: September 23, 2018  Content Version: 11.9  © 0495-9824 [employer], [place].  Care instructions adapted under license by [employer] (which disclaims liability or warranty for this information). If you have questions about a medical condition or this instruction, always ask your healthcare professional. [address] any warranty or liability for your use of this information.

## 2019-06-13 NOTE — PROGRESS NOTES
HISTORY OF PRESENT ILLNESS  Terrence Joshi is a 37 y.o. female. HPI Ms. Elias Ovalle is here for c/o left knee pain X 1 week. The onset was acute. She denies any recent injury. She had been doing some housework the week. She applied tiger balm to her knee, but has not taken any medication for it. She feels her left knee is swollen in comparison to the right. She acknowledges that her weight can be a negative factor for her knee pain. Review of Systems   Constitutional: Negative. Respiratory: Negative. Cardiovascular: Negative. Musculoskeletal: Positive for joint pain (left knee). Neurological: Negative. Physical Exam   Constitutional: She appears well-developed and well-nourished. No distress. HENT:   Head: Normocephalic and atraumatic. Cardiovascular: Normal rate and regular rhythm. Pulmonary/Chest: Effort normal and breath sounds normal.   Musculoskeletal:        Left knee: Tenderness found. +crepitus with flexion/extension. No giving out/laxity. Neurological: She is alert. Psychiatric: She has a normal mood and affect. Her behavior is normal. Thought content normal.     Visit Vitals  /80 (BP 1 Location: Left arm, BP Patient Position: Sitting)   Pulse 95   Temp 99.1 °F (37.3 °C) (Oral)   Resp 18   Ht 5' 4\" (1.626 m)   Wt 314 lb (142.4 kg)   SpO2 99%   BMI 53.90 kg/m²       ASSESSMENT and PLAN    ICD-10-CM ICD-9-CM    1. Acute pain of left knee M25.562 719.46 XR KNEE LT MAX 2 VWS   advised her that short term she can take advil/aleve/motrin etc, max 1-2 weeks. Tylenol can be taken also. She can continue to wear a knee brace. Will contact pt after final radiology report is available. Pt verbalized understanding of their condition and diagnoses, treatment plan,  as well as side effects of any new medications prescribed.

## 2019-06-13 NOTE — PROGRESS NOTES
Chief Complaint   Patient presents with    Knee Pain     left, pt denies any injury      1. Have you been to the ER, urgent care clinic since your last visit? Hospitalized since your last visit? No    2. Have you seen or consulted any other health care providers outside of the [address] [place] [address] since your last visit? Include any pap smears or colon screening.  No

## 2019-09-09 DIAGNOSIS — J40 BRONCHITIS: ICD-10-CM

## 2019-09-09 RX ORDER — ALBUTEROL SULFATE 90 UG/1
AEROSOL, METERED RESPIRATORY (INHALATION)
Qty: 18 G | Refills: 0 | Status: SHIPPED | OUTPATIENT
Start: 2019-09-09 | End: 2019-10-13 | Stop reason: SDUPTHER

## 2019-10-13 DIAGNOSIS — J40 BRONCHITIS: ICD-10-CM

## 2019-10-13 RX ORDER — ALBUTEROL SULFATE 90 UG/1
AEROSOL, METERED RESPIRATORY (INHALATION)
Qty: 18 G | Refills: 0 | Status: SHIPPED | OUTPATIENT
Start: 2019-10-13 | End: 2020-01-22 | Stop reason: SDUPTHER

## 2020-01-21 NOTE — PROGRESS NOTES
HISTORY OF PRESENT ILLNESS  Terrence Pool is a 40 y.o. female. The patient presents to establish care, for follow-up of multiple chronic health problems and for Medicare wellness evaluation following the departure of her previous PCP from the medical group. Establish Care   The history is provided by the patient and medical records. Associated symptoms include headaches and shortness of breath (with exertion). Pertinent negatives include no chest pain and no abdominal pain. Follow Up Chronic Condition   The history is provided by the patient and medical records. Associated symptoms include headaches and shortness of breath (with exertion). Pertinent negatives include no chest pain and no abdominal pain. Physical   Associated symptoms include headaches and shortness of breath (with exertion). Pertinent negatives include no chest pain and no abdominal pain.      Mr#: [id number]      Past Medical History:   Diagnosis Date    Chest pain     Chiari malformation type I ([address]     Cupping of optic disc     Diabetes [facility]     Fibroids     Hematuria     Hypercholesterolemia     Hyperlipidemia 1/6/2019    Hypertension     Muscle fasciculation     Neuropathy, peripheral     PCOS (polycystic ovarian syndrome)     Pelvic pain in female     Polycystic ovarian syndrome     Prediabetes     Vitamin D deficiency        Past Surgical History:   Procedure Laterality Date    HX DILATION AND CURETTAGE         Family History   Problem Relation Age of Onset    Arthritis-osteo Mother     Diabetes Father     Hypertension Father        Allergies   Allergen Reactions    Simvastatin Palpitations       Social History     Tobacco Use   Smoking Status Never Smoker   Smokeless Tobacco Never Used       Social History     Substance and Sexual Activity   Alcohol Use Yes    Comment: occasionally       Health Maintenance Review:  Tetanus immunization -due  Influenza immunization -due  Pap smear - 2019, sees gyn  Mammogram - current      Patient Active Problem List   Diagnosis Code    PCOS (polycystic ovarian syndrome) E28.2    Essential hypertension I10    IFG (impaired fasting glucose) R73.01    Obesity E66.9    Obesity, morbid, BMI 50 or higher ([facility]) E66.01    Hyperlipidemia E78.5    Prediabetes R73.03    Amenorrhea N91.2    Anemia D64.9    Breast lump N63.0    Chiari I malformation ([facility]) G93.5    Disorder of vulva N90.9    Female stress incontinence N39.3    HA (headache) R51    Hirsutism L68.0    Irregular periods N92.6    Menorrhagia N92.0    Metrorrhagia N92.1    Polyp of corpus uteri N84.0    Urge incontinence of urine N39.41    Vitamin D deficiency E55.9         Current Outpatient Medications:     albuterol (PROVENTIL HFA, VENTOLIN HFA, PROAIR HFA) 90 mcg/actuation inhaler, INHALE 2 PUFFS BY MOUTH EVERY 4 HOURS AS NEEDED FOR WHEEZING, Disp: 18 g, Rfl: 0    amLODIPine-benazepril (LOTREL) 10-20 mg per capsule, TAKE 1 CAPSULE BY MOUTH DAILY, Disp: 90 Cap, Rfl: 1    omeprazole (PRILOSEC) 40 mg capsule, TAKE 1 CAPSULE BY MOUTH DAILY, Disp: 30 Cap, Rfl: 5    norethindrone acetate (AYGESTIN) 5 mg tablet, TK 1 T PO BID, Disp: 60 Tab, Rfl: 0    metFORMIN (GLUMETZA ER) 500 mg TG24 24 hour tablet, Take 500 mg by mouth daily. , Disp: 30 Tab, Rfl: 0    Cholecalciferol, Vitamin D3, 50,000 unit cap, TK 1 C PO 1 TIME PER WEEK., Disp: , Rfl: 5    fluticasone (FLONASE) 50 mcg/actuation nasal spray, SHAKE LQ AND U 1 SPR IEN QD, Disp: , Rfl: 4         Review of Systems   Constitutional: Negative for chills, fever and weight loss. HENT: Positive for congestion. Negative for hearing loss. Eyes: Negative for blurred vision and double vision. Corrective lenses   Respiratory: Positive for shortness of breath (with exertion). Negative for cough and wheezing. Cardiovascular: Positive for leg swelling (left leg). Negative for chest pain, palpitations and orthopnea.    Gastrointestinal: Positive for heartburn (takes omeprazole). Negative for abdominal pain, blood in stool, constipation, diarrhea, melena, nausea and vomiting. Genitourinary: Negative for dysuria and urgency. Musculoskeletal: Positive for joint pain (left knee pain and swelling). Negative for myalgias. Skin: Negative for itching and rash. Neurological: Positive for headaches. Negative for dizziness, tingling, sensory change and focal weakness. Endo/Heme/Allergies: Positive for environmental allergies (seasonal). Psychiatric/Behavioral: Negative for depression. The patient is nervous/anxious (rare panic attacks, i.e. high bridges). The patient does not have insomnia. Visit Vitals  /85 (BP 1 Location: Left arm, BP Patient Position: Sitting)   Pulse (!) 101   Temp 97.8 °F (36.6 °C) (Oral)   Resp 22   Ht 5' 4\" (1.626 m)   Wt 335 lb 3.2 oz (152 kg)   SpO2 99%   BMI 57.54 kg/m²       Physical Exam  Vitals signs and nursing note reviewed. Constitutional:       Appearance: Normal appearance. HENT:      Head: Normocephalic. Right Ear: Tympanic membrane, ear canal and external ear normal.      Left Ear: Tympanic membrane, ear canal and external ear normal.      Mouth/Throat:      Mouth: Mucous membranes are moist.      Pharynx: Oropharynx is clear. Eyes:      Extraocular Movements: Extraocular movements intact. Conjunctiva/sclera: Conjunctivae normal.      Pupils: Pupils are equal, round, and reactive to light. Neck:      Musculoskeletal: Neck supple. Vascular: No carotid bruit. Cardiovascular:      Rate and Rhythm: Normal rate and regular rhythm. Heart sounds: Normal heart sounds. Pulmonary:      Effort: Pulmonary effort is normal.      Breath sounds: Normal breath sounds. Abdominal:      Palpations: Abdomen is soft. Tenderness: There is no tenderness. Musculoskeletal:         General: No deformity. Right lower leg: No edema. Left lower leg: No edema.    Skin:     General: Skin is warm and dry. Neurological:      General: No focal deficit present. Mental Status: She is alert and oriented to person, place, and time. Psychiatric:         Mood and Affect: Mood normal.         Behavior: Behavior normal.         ASSESSMENT and PLAN    ICD-10-CM ICD-9-CM    1. Routine general medical examination at a health care facility Z00.00 V70.0 CBC WITH AUTOMATED DIFF      HEMOGLOBIN A1C WITH EAG      LIPID PANEL      METABOLIC PANEL, COMPREHENSIVE      URINALYSIS W/ RFLX MICROSCOPIC      TSH 3RD GENERATION   2. Essential hypertension I10 401.9 amLODIPine-benazepril (LOTREL) 10-20 mg per capsule      LIPID PANEL      METABOLIC PANEL, COMPREHENSIVE      hydroCHLOROthiazide (HYDRODIURIL) 12.5 mg tablet   3. Impaired glucose tolerance R73.02 790.22    4. Chiari I malformation (HCC) G93.5 348.4    5. PCOS (polycystic ovarian syndrome) E28.2 256.4    6. Morbid obesity with BMI of 50.0-59.9, adult ([facility] E66.01 278.01 REFERRAL TO BARIATRIC SURGERY    Z68.43 V85.43    7. Gastroesophageal reflux disease, esophagitis presence not specified K21.9 530.81 omeprazole (PRILOSEC) 40 mg capsule   8. Mild intermittent asthma without complication H53.97 216.69 albuterol (PROVENTIL HFA, VENTOLIN HFA, PROAIR HFA) 90 mcg/actuation inhaler   9.  Encounter for immunization Z23 V03.89 ADMIN INFLUENZA VIRUS VAC      TETANUS, DIPHTHERIA TOXOIDS AND ACELLULAR PERTUSSIS VACCINE (TDAP), IN INDIVIDS. >=7, IM      INFLUENZA VIRUS VAC QUAD,SPLIT,PRESV FREE SYRINGE IM      CANCELED: INFLUENZA VIRUS VAC QUAD,SPLIT,PRESV FREE SYRINGE IM     Lab today, further disposition pending lab results  Avoid dietary salt, starch and sugar and follow a program of regular aerobic exercise  Continue current medications pending lab results  Add hydrochlorothiazide 12.5 mg daily in the morning  Return for follow-up in 1 month, sooner with any problems  Bariatric surgery referral

## 2020-01-22 ENCOUNTER — OFFICE VISIT (OUTPATIENT)
Dept: FAMILY MEDICINE CLINIC | Age: 45
End: 2020-01-22

## 2020-01-22 ENCOUNTER — HOSPITAL ENCOUNTER (OUTPATIENT)
Dept: LAB | Age: 45
Discharge: HOME | End: 2020-01-22
Payer: COMMERCIAL

## 2020-01-22 VITALS
TEMPERATURE: 97.8 F | OXYGEN SATURATION: 99 % | DIASTOLIC BLOOD PRESSURE: 79 MMHG | WEIGHT: 293 LBS | HEIGHT: 64 IN | HEART RATE: 101 BPM | SYSTOLIC BLOOD PRESSURE: 148 MMHG | BODY MASS INDEX: 50.02 KG/M2 | RESPIRATION RATE: 22 BRPM

## 2020-01-22 DIAGNOSIS — Z23 ENCOUNTER FOR IMMUNIZATION: ICD-10-CM

## 2020-01-22 DIAGNOSIS — E11.9 CONTROLLED TYPE 2 DIABETES MELLITUS WITHOUT COMPLICATION, WITHOUT LONG-TERM CURRENT USE OF INSULIN (HCC): Primary | ICD-10-CM

## 2020-01-22 DIAGNOSIS — E66.01 MORBID OBESITY WITH BMI OF 50.0-59.9, ADULT (HCC): ICD-10-CM

## 2020-01-22 DIAGNOSIS — Z00.00 ROUTINE GENERAL MEDICAL EXAMINATION AT A HEALTH CARE FACILITY: Primary | ICD-10-CM

## 2020-01-22 DIAGNOSIS — I10 ESSENTIAL HYPERTENSION: ICD-10-CM

## 2020-01-22 DIAGNOSIS — K21.9 GASTROESOPHAGEAL REFLUX DISEASE, ESOPHAGITIS PRESENCE NOT SPECIFIED: ICD-10-CM

## 2020-01-22 DIAGNOSIS — E78.00 HYPERCHOLESTEROLEMIA: ICD-10-CM

## 2020-01-22 DIAGNOSIS — J45.20 MILD INTERMITTENT ASTHMA WITHOUT COMPLICATION: ICD-10-CM

## 2020-01-22 DIAGNOSIS — G93.5 CHIARI I MALFORMATION (HCC): ICD-10-CM

## 2020-01-22 DIAGNOSIS — R73.02 IMPAIRED GLUCOSE TOLERANCE: ICD-10-CM

## 2020-01-22 DIAGNOSIS — Z00.00 ROUTINE GENERAL MEDICAL EXAMINATION AT A HEALTH CARE FACILITY: ICD-10-CM

## 2020-01-22 DIAGNOSIS — E28.2 PCOS (POLYCYSTIC OVARIAN SYNDROME): ICD-10-CM

## 2020-01-22 LAB
ALBUMIN SERPL-MCNC: 3.4 G/DL (ref 3.4–5)
ALBUMIN/GLOB SERPL: 0.8 {RATIO} (ref 0.8–1.7)
ALP SERPL-CCNC: 96 U/L (ref 45–117)
ALT SERPL-CCNC: 37 U/L (ref 13–56)
ANION GAP SERPL CALC-SCNC: 4 MMOL/L (ref 3–18)
APPEARANCE UR: CLEAR
AST SERPL-CCNC: 19 U/L (ref 10–38)
BASOPHILS # BLD: 0 K/UL (ref 0–0.1)
BASOPHILS NFR BLD: 0 % (ref 0–2)
BILIRUB SERPL-MCNC: 0.4 MG/DL (ref 0.2–1)
BILIRUB UR QL: NEGATIVE
BUN SERPL-MCNC: 9 MG/DL (ref 7–18)
BUN/CREAT SERPL: 10 (ref 12–20)
CALCIUM SERPL-MCNC: 8.8 MG/DL (ref 8.5–10.1)
CHLORIDE SERPL-SCNC: 106 MMOL/L (ref 100–111)
CHOLEST SERPL-MCNC: 162 MG/DL
CO2 SERPL-SCNC: 28 MMOL/L (ref 21–32)
COLOR UR: YELLOW
CREAT SERPL-MCNC: 0.89 MG/DL (ref 0.6–1.3)
DIFFERENTIAL METHOD BLD: ABNORMAL
EOSINOPHIL # BLD: 0.2 K/UL (ref 0–0.4)
EOSINOPHIL NFR BLD: 2 % (ref 0–5)
ERYTHROCYTE [DISTWIDTH] IN BLOOD BY AUTOMATED COUNT: 14.7 % (ref 11.6–14.5)
EST. AVERAGE GLUCOSE BLD GHB EST-MCNC: 166 MG/DL
GLOBULIN SER CALC-MCNC: 4.1 G/DL (ref 2–4)
GLUCOSE SERPL-MCNC: 232 MG/DL (ref 74–99)
GLUCOSE UR STRIP.AUTO-MCNC: 100 MG/DL
HBA1C MFR BLD: 7.4 % (ref 4.2–5.6)
HCT VFR BLD AUTO: 41.6 % (ref 35–45)
HDLC SERPL-MCNC: 33 MG/DL (ref 40–60)
HDLC SERPL: 4.9 {RATIO} (ref 0–5)
HGB BLD-MCNC: 13.6 G/DL (ref 12–16)
HGB UR QL STRIP: NEGATIVE
KETONES UR QL STRIP.AUTO: NEGATIVE MG/DL
LDLC SERPL CALC-MCNC: 112.8 MG/DL (ref 0–100)
LEUKOCYTE ESTERASE UR QL STRIP.AUTO: NEGATIVE
LIPID PROFILE,FLP: ABNORMAL
LYMPHOCYTES # BLD: 2 K/UL (ref 0.9–3.6)
LYMPHOCYTES NFR BLD: 24 % (ref 21–52)
MCH RBC QN AUTO: 27.6 PG (ref 24–34)
MCHC RBC AUTO-ENTMCNC: 32.7 G/DL (ref 31–37)
MCV RBC AUTO: 84.6 FL (ref 74–97)
MONOCYTES # BLD: 0.5 K/UL (ref 0.05–1.2)
MONOCYTES NFR BLD: 5 % (ref 3–10)
NEUTS SEG # BLD: 5.8 K/UL (ref 1.8–8)
NEUTS SEG NFR BLD: 69 % (ref 40–73)
NITRITE UR QL STRIP.AUTO: NEGATIVE
PH UR STRIP: 6 [PH] (ref 5–8)
PLATELET # BLD AUTO: 159 K/UL (ref 135–420)
PMV BLD AUTO: 11 FL (ref 9.2–11.8)
POTASSIUM SERPL-SCNC: 4 MMOL/L (ref 3.5–5.5)
PROT SERPL-MCNC: 7.5 G/DL (ref 6.4–8.2)
PROT UR STRIP-MCNC: NEGATIVE MG/DL
RBC # BLD AUTO: 4.92 M/UL (ref 4.2–5.3)
SODIUM SERPL-SCNC: 138 MMOL/L (ref 136–145)
SP GR UR REFRACTOMETRY: 1.02 (ref 1–1.03)
TRIGL SERPL-MCNC: 81 MG/DL (ref ?–150)
TSH SERPL DL<=0.05 MIU/L-ACNC: 1.71 UIU/ML (ref 0.36–3.74)
UROBILINOGEN UR QL STRIP.AUTO: 0.2 EU/DL (ref 0.2–1)
VLDLC SERPL CALC-MCNC: 16.2 MG/DL
WBC # BLD AUTO: 8.5 K/UL (ref 4.6–13.2)

## 2020-01-22 PROCEDURE — 80061 LIPID PANEL: CPT

## 2020-01-22 PROCEDURE — 85025 COMPLETE CBC W/AUTO DIFF WBC: CPT

## 2020-01-22 PROCEDURE — 84443 ASSAY THYROID STIM HORMONE: CPT

## 2020-01-22 PROCEDURE — 36415 COLL VENOUS BLD VENIPUNCTURE: CPT

## 2020-01-22 PROCEDURE — 81003 URINALYSIS AUTO W/O SCOPE: CPT

## 2020-01-22 PROCEDURE — 80053 COMPREHEN METABOLIC PANEL: CPT

## 2020-01-22 PROCEDURE — 83036 HEMOGLOBIN GLYCOSYLATED A1C: CPT

## 2020-01-22 RX ORDER — ALBUTEROL SULFATE 90 UG/1
AEROSOL, METERED RESPIRATORY (INHALATION)
Qty: 18 G | Refills: 3 | Status: SHIPPED | OUTPATIENT
Start: 2020-01-22

## 2020-01-22 RX ORDER — OMEPRAZOLE 40 MG/1
CAPSULE, DELAYED RELEASE ORAL
Qty: 90 CAP | Refills: 3 | Status: SHIPPED | OUTPATIENT
Start: 2020-01-22

## 2020-01-22 RX ORDER — AMLODIPINE AND BENAZEPRIL HYDROCHLORIDE 10; 20 MG/1; MG/1
1 CAPSULE ORAL DAILY
Qty: 90 CAP | Refills: 3 | Status: SHIPPED | OUTPATIENT
Start: 2020-01-22

## 2020-01-22 RX ORDER — FLUTICASONE PROPIONATE 50 MCG
SPRAY, SUSPENSION (ML) NASAL
Qty: 1 BOTTLE | Refills: 4 | Status: SHIPPED | OUTPATIENT
Start: 2020-01-22

## 2020-01-22 RX ORDER — HYDROCHLOROTHIAZIDE 12.5 MG/1
12.5 TABLET ORAL DAILY
Qty: 90 TAB | Refills: 3 | Status: SHIPPED | OUTPATIENT
Start: 2020-01-22 | End: 2020-08-19

## 2020-01-22 RX ORDER — METFORMIN HYDROCHLORIDE 500 MG/1
500 TABLET, EXTENDED RELEASE ORAL
Qty: 180 TAB | Refills: 3 | Status: SHIPPED | OUTPATIENT
Start: 2020-01-22 | End: 2020-02-16

## 2020-01-22 RX ORDER — METFORMIN HYDROCHLORIDE 500 MG/1
500 TABLET, FILM COATED, EXTENDED RELEASE ORAL DAILY
Qty: 30 TAB | Refills: 0 | Status: CANCELLED | OUTPATIENT
Start: 2020-01-22

## 2020-01-22 NOTE — PROGRESS NOTES
Please advise that lab results show her hemoglobin A1c has increased from 6.0% a year ago to 7.4% now which is consistent with type 2 diabetes. Please ask her to increase her Metformin ER from 1, 500 mg tablet up to 2, 500 mg tablets daily with the evening meal.  Her cholesterol level is also too high for someone with diabetes but review of her chart indicates that she had an adverse reaction with simvastatin in the past so we will have to discuss what would be the most safe and appropriate way to treat her cholesterol problem. These results mean that it is even more important than before that she decrease consumption of dietary starch and sugar and get as much regular aerobic exercise as possible and also that she would benefit even more from weight loss surgery. We will discuss all this in greater detail when she returns for follow-up in 1 month.

## 2020-01-22 NOTE — PATIENT INSTRUCTIONS
Lab today, further disposition pending lab results Avoid dietary salt, starch and sugar and follow a program of regular aerobic exercise Continue current medications pending lab results Continue current medications and hydrochlorothiazide 12.5 mg daily in the morning Return for follow-up in 1 month, sooner with any problems Well Visit, Ages 25 to 48: Care Instructions Your Care Instructions Physical exams can help you stay healthy. Your doctor has checked your overall health and may have suggested ways to take good care of yourself. He or she also may have recommended tests. At home, you can help prevent illness with healthy eating, regular exercise, and other steps. Follow-up care is a key part of your treatment and safety. Be sure to make and go to all appointments, and call your doctor if you are having problems. It's also a good idea to know your test results and keep a list of the medicines you take. How can you care for yourself at home? · Reach and stay at a healthy weight. This will lower your risk for many problems, such as obesity, diabetes, heart disease, and high blood pressure. · Get at least 30 minutes of physical activity on most days of the week. Walking is a good choice. You also may want to do other activities, such as running, swimming, cycling, or playing tennis or team sports. Discuss any changes in your exercise program with your doctor. · Do not smoke or allow others to smoke around you. If you need help quitting, talk to your doctor about stop-smoking programs and medicines. These can increase your chances of quitting for good. · Talk to your doctor about whether you have any risk factors for sexually transmitted infections (STIs). Having one sex partner (who does not have STIs and does not have sex with anyone else) is a good way to avoid these infections. · Use birth control if you do not want to have children at this time.  Talk with your doctor about the choices available and what might be best for you. · Protect your skin from too much sun. When you're outdoors from 10 a.m. to 4 p.m., stay in the shade or cover up with clothing and a hat with a wide brim. Wear sunglasses that block UV rays. Even when it's cloudy, put broad-spectrum sunscreen (SPF 30 or higher) on any exposed skin. · See a dentist one or two times a year for checkups and to have your teeth cleaned. · Wear a seat belt in the car. Follow your doctor's advice about when to have certain tests. These tests can spot problems early. For everyone · Cholesterol. Have the fat (cholesterol) in your blood tested after age 21. Your doctor will tell you how often to have this done based on your age, family history, or other things that can increase your risk for heart disease. · Blood pressure. Have your blood pressure checked during a routine doctor visit. Your doctor will tell you how often to check your blood pressure based on your age, your blood pressure results, and other factors. · Vision. Talk with your doctor about how often to have a glaucoma test. 
· Diabetes. Ask your doctor whether you should have tests for diabetes. · Colon cancer. Your risk for colorectal cancer gets higher as you get older. Some experts say that adults should start regular screening at age 48 and stop at age 76. Others say to start before age 48 or continue after age 76. Talk with your doctor about your risk and when to start and stop screening. For women · Breast exam and mammogram. Talk to your doctor about when you should have a clinical breast exam and a mammogram. Medical experts differ on whether and how often women under 50 should have these tests. Your doctor can help you decide what is right for you. · Cervical cancer screening test and pelvic exam. Begin with a Pap test at age 24.  The test often is part of a pelvic exam. Starting at age 27, you may choose to have a Pap test, an HPV test, or both tests at the same time (called co-testing). Talk with your doctor about how often to have testing. · Tests for sexually transmitted infections (STIs). Ask whether you should have tests for STIs. You may be at risk if you have sex with more than one person, especially if your partners do not wear condoms. For men · Tests for sexually transmitted infections (STIs). Ask whether you should have tests for STIs. You may be at risk if you have sex with more than one person, especially if you do not wear a condom. · Testicular cancer exam. Ask your doctor whether you should check your testicles regularly. · Prostate exam. Talk to your doctor about whether you should have a blood test (called a PSA test) for prostate cancer. Experts differ on whether and when men should have this test. Some experts suggest it if you are older than 39 and are -American or have a father or brother who got prostate cancer when he was younger than 72. When should you call for help? Watch closely for changes in your health, and be sure to contact your doctor if you have any problems or symptoms that concern you. Where can you learn more? Go to http://edinson-willy.info/. Enter P072 in the search box to learn more about \"Well Visit, Ages 25 to 48: Care Instructions. \" Current as of: December 13, 2018 Content Version: 12.2 © 1970-6006 [employer]. Care instructions adapted under license by [employer] (which disclaims liability or warranty for this information). If you have questions about a medical condition or this instruction, always ask your healthcare professional. [place] [address] any warranty or liability for your use of this information.

## 2020-01-22 NOTE — PROGRESS NOTES
Brendan Cabezas is a 40 y.o. female (: 1975) presenting to address:    Chief Complaint   Patient presents with   [facility].     former patient of [facility]       flu shot today    Hypertension     follow up       Vitals:    20 0807 20 0833   BP: 158/85 148/79   Pulse: (!) 101    Resp: 22    Temp: 97.8 °F (36.6 °C)    TempSrc: Oral    SpO2: 99%    Weight: 335 lb 3.2 oz (152 kg)    Height: 5' 4\" (1.626 m)    PainSc:   0 - No pain        Hearing/Vision:   No exam data present    Learning Assessment:     Learning Assessment 2020   PRIMARY LEARNER Patient   HIGHEST LEVEL OF EDUCATION - PRIMARY LEARNER  2 YEARS OF COLLEGE   BARRIERS PRIMARY LEARNER NONE   CO-LEARNER CAREGIVER No   PRIMARY LANGUAGE ENGLISH   LEARNER PREFERENCE PRIMARY OTHER (COMMENT)   ANSWERED BY patient   RELATIONSHIP SELF     Depression Screening:     3 most recent PHQ Screens 2020   Little interest or pleasure in doing things Not at all   Feeling down, depressed, irritable, or hopeless Not at all   Total Score PHQ 2 0     Fall Risk Assessment:   No flowsheet data found. Abuse Screening:   No flowsheet data found. Coordination of Care Questionaire:   1. Have you been to the ER, urgent care clinic since your last visit? Hospitalized since your last visit? NO    2. Have you seen or consulted any other health care providers outside of the [address] [place] [address] since your last visit? Include any pap smears or colon screening. NO    Advanced Directive:   1. Do you have an Advanced Directive? NO    2. Would you like information on Advanced Directives? NO    Tdap Immunization/s administered 2020 by Sarah Hamilton with consent. Patient tolerated procedure well. No reactions noted.

## 2020-01-23 NOTE — PROGRESS NOTES
Discussed lab results w/pt and she is very concerned with taking the high dose of metformin; states she was previously on 750 mg in the past and was unable to tolerate that dosage; wants to know if there is a different medication she can take for her diabetes.

## 2020-01-29 NOTE — PROGRESS NOTES
Patient left message stating the higher dosage for metformin caused diarrhea and severe cramps; previously was Rx'd metformin 500 mg ER and has done well on that dosage and would like to continue; pt also states while on simvastatin she experienced hives, rash and heart palpitations.

## 2020-01-29 NOTE — PROGRESS NOTES
Please ask her to  the prescription for metformin  but just take 1 tablet daily with the evening meal for now.   We will discuss treatment options for diabetes and cholesterol at her follow-up appointment on 2/28/2020

## 2020-02-16 DIAGNOSIS — E11.9 CONTROLLED TYPE 2 DIABETES MELLITUS WITHOUT COMPLICATION, WITHOUT LONG-TERM CURRENT USE OF INSULIN (HCC): ICD-10-CM

## 2020-02-16 RX ORDER — METFORMIN HYDROCHLORIDE 500 MG/1
TABLET, EXTENDED RELEASE ORAL
Qty: 90 TAB | Refills: 4 | Status: SHIPPED | OUTPATIENT
Start: 2020-02-16

## 2020-02-28 ENCOUNTER — OFFICE VISIT (OUTPATIENT)
Dept: FAMILY MEDICINE CLINIC | Age: 45
End: 2020-02-28

## 2020-02-28 VITALS
RESPIRATION RATE: 20 BRPM | WEIGHT: 293 LBS | HEART RATE: 103 BPM | DIASTOLIC BLOOD PRESSURE: 81 MMHG | HEIGHT: 64 IN | OXYGEN SATURATION: 92 % | SYSTOLIC BLOOD PRESSURE: 121 MMHG | BODY MASS INDEX: 50.02 KG/M2 | TEMPERATURE: 97.6 F

## 2020-02-28 DIAGNOSIS — I10 ESSENTIAL HYPERTENSION: ICD-10-CM

## 2020-02-28 DIAGNOSIS — E66.01 MORBID OBESITY WITH BMI OF 50.0-59.9, ADULT (HCC): ICD-10-CM

## 2020-02-28 DIAGNOSIS — E78.00 HYPERCHOLESTEROLEMIA: ICD-10-CM

## 2020-02-28 DIAGNOSIS — G93.5 CHIARI I MALFORMATION (HCC): ICD-10-CM

## 2020-02-28 DIAGNOSIS — E11.9 CONTROLLED TYPE 2 DIABETES MELLITUS WITHOUT COMPLICATION, WITHOUT LONG-TERM CURRENT USE OF INSULIN (HCC): Primary | ICD-10-CM

## 2020-02-28 NOTE — PROGRESS NOTES
HISTORY OF PRESENT ILLNESS  Terrence Frances is a 40 y.o. female. Ms. Ana Reilly was seen for the first time as a patient here 1 month ago with known problems including hypertension, hypercholesterolemia, prediabetes, PCOS, and morbid obesity. At that time she was treated for prediabetes with metformin  mg daily. Frequent lab results revealed that her hemoglobin A1c had increased to 7.4% up from 6.0% in January 2019. She was advised to increase the metformin 500 ER up to 2 tablets daily but reported that she had previously been unable to tolerate doses greater than 500 mg. Her LDL cholesterol was also found to be above goal for patient with diabetes but it was noted that she had previously reported a reaction to simvastatin. She was started on HCTZ 12.5 mg daily to take in addition to the Lotrel 10-20 mg already prescribed because of unsatisfactory control of hypertension. She was referred for bariatric surgery evaluation.     Mr#: [id number]      Patient Active Problem List   Diagnosis Code    PCOS (polycystic ovarian syndrome) E28.2    Essential hypertension I10    IFG (impaired fasting glucose) R73.01    Obesity E66.9    Obesity, morbid, BMI 50 or higher ([facility]) E66.01    Hypercholesterolemia E78.00    Prediabetes R73.03    Amenorrhea N91.2    Anemia D64.9    Breast lump N63.0    Chiari I malformation ([facility]) G93.5    Disorder of vulva N90.9    Female stress incontinence N39.3    HA (headache) R51    Hirsutism L68.0    Irregular periods N92.6    Menorrhagia N92.0    Metrorrhagia N92.1    Polyp of corpus uteri N84.0    Urge incontinence of urine N39.41    Vitamin D deficiency E55.9    Morbid obesity with BMI of 50.0-59.9, adult ([facility]) E66.01, Z68.43    Gastroesophageal reflux disease K21.9    Mild intermittent asthma without complication Y93.26    Controlled type 2 diabetes mellitus without complication, without long-term current use of insulin ([facility]) E11.9         Current Outpatient Medications:     metFORMIN ER (GLUCOPHAGE XR) 500 mg tablet, TAKE 1 TABLET BY MOUTH DAILY WITH DINNER, Disp: 90 Tab, Rfl: 4    amLODIPine-benazepril (LOTREL) 10-20 mg per capsule, Take 1 Cap by mouth daily. , Disp: 90 Cap, Rfl: 3    omeprazole (PRILOSEC) 40 mg capsule, TAKE 1 CAPSULE BY MOUTH DAILY, Disp: 90 Cap, Rfl: 3    albuterol (PROVENTIL HFA, VENTOLIN HFA, PROAIR HFA) 90 mcg/actuation inhaler, INHALE 2 PUFFS BY MOUTH EVERY 4 HOURS AS NEEDED FOR WHEEZING, Disp: 18 g, Rfl: 3    fluticasone propionate (FLONASE) 50 mcg/actuation nasal spray, SHAKE LQ AND U 1 SPR IEN QD, Disp: 1 Bottle, Rfl: 4    hydroCHLOROthiazide (HYDRODIURIL) 12.5 mg tablet, Take 1 Tab by mouth daily. , Disp: 90 Tab, Rfl: 3    norethindrone acetate (AYGESTIN) 5 mg tablet, TK 1 T PO BID, Disp: 60 Tab, Rfl: 0    Cholecalciferol, Vitamin D3, 50,000 unit cap, TK 1 C PO 1 TIME PER WEEK., Disp: , Rfl: 5     Allergies   Allergen Reactions    Simvastatin Palpitations         Review of Systems   Constitutional: Negative for weight loss. Respiratory: Negative for shortness of breath. Cardiovascular: Negative for chest pain and palpitations. Gastrointestinal: Negative for abdominal pain. Genitourinary: Negative for frequency. Neurological: Negative for tingling and sensory change. Endo/Heme/Allergies: Negative for polydipsia. Physical Exam  Vitals signs and nursing note reviewed. Constitutional:       Appearance: She is well-developed. She is obese. Comments: Intentional 19 pound weight loss since 1/22/2019   HENT:      Head: Normocephalic. Eyes:      Conjunctiva/sclera: Conjunctivae normal.   Neck:      Musculoskeletal: Neck supple. Cardiovascular:      Rate and Rhythm: Normal rate and regular rhythm. Heart sounds: Normal heart sounds. Pulmonary:      Effort: Pulmonary effort is normal.      Breath sounds: Normal breath sounds. Skin:     General: Skin is warm and dry.    Neurological:      Mental Status: She is alert and oriented to person, place, and time. Psychiatric:         Behavior: Behavior normal.            Diabetic foot exam performed by Annemarie Jennings MD     Measurement  Response Nurse Comment Physician Comment   Monofilament  R - normal sensation with micro filament  L - normal sensation with micro filament     Pulse DP R - 2+ (normal)  L - 2+ (normal)     Pulse TP R - absent  L - absent     Structural deformity R - None  L - None     Skin Integrity / Deformity R - None  L - None                  Results for Cm Rees (MRN [id number]) as of 2/28/2020 06:27   Ref. Range 1/8/2019 09:34 3/29/2019 15:26 4/3/2019 13:05 6/13/2019 14:25 1/22/2020 09:06   Cholesterol, total Latest Ref Range: <200 MG/    162   HDL Cholesterol Latest Ref Range: 40 - 60 MG/DL 42 (L)    33 (L)   Non-HDL Cholesterol Latest Ref Range: <130 mg/dL (calc) 125       LDL-CHOLESTEROL Latest Units: mg/dL (calc) 111 (H)       CHOL/HDL Ratio Latest Ref Range: 0 - 5.0       4.9   Cholesterol/HDL ratio Latest Ref Range: <5.0 (calc) 4.0       VLDL, calculated Latest Units: MG/DL     16.2   LDL, calculated Latest Ref Range: 0 - 100 MG/DL     112.8 (H)   Hemoglobin A1c, (calculated) Latest Ref Range: 4.2 - 5.6 % 6.0 (H)    7.4 (H)   Est. average glucose Latest Units: mg/dL     166   TSH Latest Ref Range: 0.36 - 3.74 uIU/mL 1.65    1.71   Glucose Latest Ref Range: 74 - 99 mg/dL 88    232 (H)               ASSESSMENT and PLAN    ICD-10-CM ICD-9-CM    1. Controlled type 2 diabetes mellitus without complication, without long-term current use of insulin (HCC) E11.9 250.00 REFERRAL TO NUTRITION       DIABETES FOOT EXAM   2. Essential hypertension I10 401.9 REFERRAL TO NUTRITION   3. Hypercholesterolemia E78.00 272.0 REFERRAL TO NUTRITION   4. Chiari I malformation (HCC) G93.5 348.4    5.  Morbid obesity with BMI of 50.0-59.9, adult ([facility] E66.01 278.01 REFERRAL TO NUTRITION    Z68.43 V85.43    Reports a significant adverse reaction to simvastatin, will therefore work on lipid management with lifestyle changes at least initially  Continue excellent progress by avoiding dietary salt starch and sugar and by following a program of regular aerobic exercise  Nutrition referral  Schedule a diabetic eye exam, have results sent here. Return for follow-up in 2 months with A1c at follow-up, urine microalbumin at follow-up, [id number] at follow-up    PLEASE NOTE:   This document has been produced using voice recognition software. Unrecognized errors in transcription may be present.

## 2020-02-28 NOTE — PROGRESS NOTES
Stephanie Steiner is a 40 y.o. female (: 1975) presenting to address:    Chief Complaint   Patient presents with    Diabetes     flu shot declined    Hypertension    Cholesterol Problem       Vitals:    20 1433   BP: 121/81   Pulse: (!) 103   Resp: 20   Temp: 97.6 °F (36.4 °C)   TempSrc: Oral   SpO2: 92%   Weight: 316 lb (143.3 kg)   Height: 5' 4\" (1.626 m)   PainSc:   6   PainLoc: Neck       Hearing/Vision:   No exam data present    Learning Assessment:     Learning Assessment 2020   PRIMARY LEARNER Patient   HIGHEST LEVEL OF EDUCATION - PRIMARY LEARNER  2 YEARS OF COLLEGE   BARRIERS PRIMARY LEARNER NONE   CO-LEARNER CAREGIVER No   PRIMARY LANGUAGE ENGLISH   LEARNER PREFERENCE PRIMARY OTHER (COMMENT)   ANSWERED BY patient   RELATIONSHIP SELF     Depression Screening:     3 most recent PHQ Screens 2020   Little interest or pleasure in doing things Not at all   Feeling down, depressed, irritable, or hopeless Not at all   Total Score PHQ 2 0     Fall Risk Assessment:   No flowsheet data found. Abuse Screening:   No flowsheet data found. Coordination of Care Questionaire:   1. Have you been to the ER, urgent care clinic since your last visit? Hospitalized since your last visit? NO    2. Have you seen or consulted any other health care providers outside of the [address] [place] [address] since your last visit? Include any pap smears or colon screening. NO    Advanced Directive:   1. Do you have an Advanced Directive? NO    2. Would you like information on Advanced Directives?  NO

## 2020-02-28 NOTE — PATIENT INSTRUCTIONS
Continue excellent progress by avoiding dietary salt starch and sugar and by following a program of regular aerobic exercise Nutrition referral 
Schedule a diabetic eye exam, have results sent here. Return for follow-up in 2 months with A1c at follow-up Learning About Low-Carbohydrate Diets for Weight Loss What is a low-carbohydrate diet? Low-carb diets avoid foods that are high in carbohydrate. These high-carb foods include pasta, bread, rice, cereal, fruits, and starchy vegetables. Instead, these diets usually have you eat foods that are high in fat and protein. Many people lose weight quickly on a low-carb diet. But the early weight loss is water. People on this diet often gain the weight back after they start eating carbs again. Not all diet plans are safe or work well. A lot of the evidence shows that low-carb diets aren't healthy. That's because these diets often don't include healthy foods like fruits and vegetables. Losing weight safely means balancing protein, fat, and carbs with every meal and snack. And low-carb diets don't always provide the vitamins, minerals, and fiber you need. If you have a serious medical condition, talk to your doctor before you try any diet. These conditions include kidney disease, heart disease, type 2 diabetes, high cholesterol, and high blood pressure. If you are pregnant, it may not be safe for your baby if you are on a low-carb diet. How can you lose weight safely? You might have heard that a diet plan helped another person lose weight. But that doesn't mean that it will work for you. It is very hard to stay on a diet that includes lots of big changes in your eating habits. If you want to get to a healthy weight and stay there, making healthy lifestyle changes will often work better than dieting. These steps can help. · Make a plan for change. Work with your doctor to create a plan that is right for you. · See a dietitian. He or she can show you how to make healthy changes in your eating habits. · Manage stress. If you have a lot of stress in your life, it can be hard to focus on making healthy changes to your daily habits. · Track your food and activity. You are likely to do better at losing weight if you keep track of what you eat and what you do. Follow-up care is a key part of your treatment and safety. Be sure to make and go to all appointments, and call your doctor if you are having problems. It's also a good idea to know your test results and keep a list of the medicines you take. Where can you learn more? Go to http://edinson-willy.info/. Enter A121 in the search box to learn more about \"Learning About Low-Carbohydrate Diets for Weight Loss. \" Current as of: November 7, 2018 Content Version: 12.2 © 0460-0231 [employer]. Care instructions adapted under license by [employer] (which disclaims liability or warranty for this information). If you have questions about a medical condition or this instruction, always ask your healthcare professional. [place] [address] any warranty or liability for your use of this information. Counting Carbohydrates: Care Instructions Your Care Instructions You don't have to eat special foods when you have diabetes. You just have to be careful to eat healthy foods. Carbohydrates (carbs) raise blood sugar higher and quicker than any other nutrient. Carbs are found in desserts, breads and cereals, and fruit. They're also in starchy vegetables. These include potatoes, corn, and grains such as rice and pasta. Carbs are also in milk and yogurt. The more carbs you eat at one time, the higher your blood sugar will rise. Spreading carbs all through the day helps keep your blood sugar levels within your target range. Counting carbs is one of the best ways to keep your blood sugar under control. If you use insulin, counting carbs helps you match the right amount of insulin to the number of grams of carbs in a meal. Then you can change your diet and insulin dose as needed. Testing your blood sugar several times a day can help you learn how carbs affect your blood sugar. A registered dietitian or certified diabetes educator can help you plan meals and snacks. Follow-up care is a key part of your treatment and safety. Be sure to make and go to all appointments, and call your doctor if you are having problems. It's also a good idea to know your test results and keep a list of the medicines you take. How can you care for yourself at home? Know your daily amount of carbohydrates Your daily amount depends on several things, such as your weight, how active you are, which diabetes medicines you take, and what your goals are for your blood sugar levels. A registered dietitian or certified diabetes educator can help you plan how many carbs to include in each meal and snack. For most adults, a guideline for the daily amount of carbs is: · 45 to 60 grams at each meal. That's about the same as 3 to 4 carbohydrate servings. · 15 to 20 grams at each snack. That's about the same as 1 carbohydrate serving. Count carbs Counting carbs lets you know how much rapid-acting insulin to take before you eat. If you use an insulin pump, you get a constant rate of insulin during the day. So the pump must be programmed at meals. This gives you extra insulin to cover the rise in blood sugar after meals. If you take insulin: 
· Learn your own insulin-to-carb ratio. You and your diabetes health professional will figure out the ratio. You can do this by testing your blood sugar after meals. For example, you may need a certain amount of insulin for every 15 grams of carbs. · Add up the carb grams in a meal. Then you can figure out how many units of insulin to take based on your insulin-to-carb ratio. · Exercise lowers blood sugar. You can use less insulin than you would if you were not doing exercise. Keep in mind that timing matters. If you exercise within 1 hour after a meal, your body may need less insulin for that meal than it would if you exercised 3 hours after the meal. Test your blood sugar to find out how exercise affects your need for insulin. If you do or don't take insulin: 
· Look at labels on packaged foods. This can tell you how many carbs are in a serving. You can also use guides from the American Diabetes Association. · Be aware of portions, or serving sizes. If a package has two servings and you eat the whole package, you need to double the number of grams of carbohydrate listed for one serving. · Protein, fat, and fiber do not raise blood sugar as much as carbs do. If you eat a lot of these nutrients in a meal, your blood sugar will rise more slowly than it would otherwise. Eat from all food groups · Eat at least three meals a day. · Plan meals to include food from all the food groups. The food groups include grains, fruits, dairy, proteins, and vegetables. · Talk to your dietitian or diabetes educator about ways to add limited amounts of sweets into your meal plan. · If you drink alcohol, talk to your doctor. It may not be recommended when you are taking certain diabetes medicines. Where can you learn more? Go to http://edinson-willy.info/. Enter S504 in the search box to learn more about \"Counting Carbohydrates: Care Instructions. \" Current as of: April 16, 2019 Content Version: 12.2 © 3106-1491 [employer]. Care instructions adapted under license by [employer] (which disclaims liability or warranty for this information). If you have questions about a medical condition or this instruction, always ask your healthcare professional. [address] any warranty or liability for your use of this information.

## 2020-03-18 ENCOUNTER — CLINICAL SUPPORT (OUTPATIENT)
Dept: FAMILY MEDICINE CLINIC | Age: 45
End: 2020-03-18

## 2020-03-18 DIAGNOSIS — Z23 ENCOUNTER FOR IMMUNIZATION: Primary | ICD-10-CM

## 2020-03-18 NOTE — PROGRESS NOTES
Immunization of the Pneumovax 23 was administered 3/18/2020 by Poornima Singletary LPN. Patient tolerated procedure well. No reactions noted.

## 2020-04-01 ENCOUNTER — TELEPHONE (OUTPATIENT)
Dept: FAMILY MEDICINE CLINIC | Age: 45
End: 2020-04-01

## 2020-04-01 NOTE — TELEPHONE ENCOUNTER
Pt called stating that she had taken a class with someone a couple weeks ago who's  (that was not in the class) passed away from Covid19/ I asked the patient if she had come into contact with the person in the class the patient states she doesn't remember the person in the class or where they sat. Pt complained of diarrhea and a low grade fever of 102.00 pt would like to know what to do going forward.  Please advise    Best contact [phone]

## 2020-04-03 ENCOUNTER — TELEPHONE (OUTPATIENT)
Dept: FAMILY MEDICINE CLINIC | Age: 45
End: 2020-04-03

## 2020-04-03 NOTE — TELEPHONE ENCOUNTER
I do not know that any additional medication is indicated based on this history. She should continue to take Tylenol and as previously discussed, present to the emergency department if she develops significant shortness of breath. I would be happy to discuss it with her in greater detail in a telemedicine visit if she would like to do that.

## 2020-04-03 NOTE — TELEPHONE ENCOUNTER
I called patient to schedule wellness. Patient stated she has a low grade fever 99.9 and chest congestion    She would like to know if you would send in medication for her ?     Please advise

## 2020-04-08 ENCOUNTER — E-VISIT (OUTPATIENT)
Dept: FAMILY MEDICINE CLINIC | Age: 45
End: 2020-04-08

## 2020-04-08 DIAGNOSIS — R39.9 UTI SYMPTOMS: Primary | ICD-10-CM

## 2020-04-08 RX ORDER — NITROFURANTOIN 25; 75 MG/1; MG/1
100 CAPSULE ORAL 2 TIMES DAILY
Qty: 14 CAP | Refills: 0 | Status: SHIPPED | OUTPATIENT
Start: 2020-04-08 | End: 2020-04-14 | Stop reason: ALTCHOICE

## 2020-04-08 NOTE — TELEPHONE ENCOUNTER
70-year-old female with ED visit note reporting dysuria, frequency with some nausea but without vomiting, no evidence of fever or flank pain. Chart reviewed, no evidence of medication allergies.   Assessment: UTI symptoms  Plan: Macrobid 100 mg twice daily for 7 days  Reply including plan sent to the patient and recommendation to drink plenty of fluids call back if symptoms worsen or fail to improve

## 2020-04-14 DIAGNOSIS — R39.9 UTI SYMPTOMS: Primary | ICD-10-CM

## 2020-04-14 RX ORDER — SULFAMETHOXAZOLE AND TRIMETHOPRIM 800; 160 MG/1; MG/1
1 TABLET ORAL 2 TIMES DAILY
Qty: 14 TAB | Refills: 0 | Status: SHIPPED | OUTPATIENT
Start: 2020-04-14 | End: 2020-04-21

## 2020-04-15 ENCOUNTER — APPOINTMENT (OUTPATIENT)
Dept: NUTRITION | Age: 45
End: 2020-04-15

## 2020-04-23 ENCOUNTER — VIRTUAL VISIT (OUTPATIENT)
Dept: FAMILY MEDICINE CLINIC | Age: 45
End: 2020-04-23

## 2020-04-23 DIAGNOSIS — K21.9 GASTROESOPHAGEAL REFLUX DISEASE, ESOPHAGITIS PRESENCE NOT SPECIFIED: ICD-10-CM

## 2020-04-23 DIAGNOSIS — G93.5 CHIARI I MALFORMATION (HCC): ICD-10-CM

## 2020-04-23 DIAGNOSIS — E66.01 MORBID OBESITY WITH BMI OF 50.0-59.9, ADULT (HCC): ICD-10-CM

## 2020-04-23 DIAGNOSIS — I10 ESSENTIAL HYPERTENSION: ICD-10-CM

## 2020-04-23 DIAGNOSIS — M54.9 MECHANICAL BACK PAIN: Primary | ICD-10-CM

## 2020-04-23 DIAGNOSIS — E28.2 PCOS (POLYCYSTIC OVARIAN SYNDROME): ICD-10-CM

## 2020-04-23 DIAGNOSIS — R39.9 UTI SYMPTOMS: ICD-10-CM

## 2020-04-23 DIAGNOSIS — R10.30 LOWER ABDOMINAL PAIN: ICD-10-CM

## 2020-04-23 RX ORDER — ASPIRIN 81 MG/1
81 TABLET ORAL DAILY
COMMUNITY

## 2020-04-23 RX ORDER — BISMUTH SUBSALICYLATE 262 MG
1 TABLET,CHEWABLE ORAL DAILY
COMMUNITY

## 2020-04-23 NOTE — PROGRESS NOTES
Corina Salazar is a 40 y.o. female who was seen by synchronous (real-time) audio-video technology using doxy. me on 4/23/2020. Mr#: [id number]    Consent:  She and/or her healthcare decision maker is aware that this patient-initiated Telehealth encounter is a billable service, with coverage as determined by her insurance carrier. She is aware that she may receive a bill and has provided verbal consent to proceed: YES    I was in the office while conducting this encounter. 712  HPI/Subjective:     She reports concern about the etiology for back and lower abdominal discomfort. She has recently been treated with 2 courses of antibiotics including Macrodantin and then Bactrim after reporting symptoms. She currently denies any frequency urgency or dysuria but describes low back pain which seems to radiate around anteriorly to the lower abdomen. She denies any change in bowel habits. She does have a history of a fibroid uterus with her previous heavy painful flow controlled with hormonal therapy. She reports concern about ulcers and gallbladder disease since she is aware of having cholelithiasis but denies any upper abdominal pain or postprandial pain or nausea. She acknowledges that her back pain is exacerbated with movement such as standing up and bending.           Patient Active Problem List   Diagnosis Code    PCOS (polycystic ovarian syndrome) E28.2    Essential hypertension I10    IFG (impaired fasting glucose) R73.01    Obesity E66.9    Obesity, morbid, BMI 50 or higher ([facility]) E66.01    Hypercholesterolemia E78.00    Prediabetes R73.03    Amenorrhea N91.2    Anemia D64.9    Breast lump N63.0    Chiari I malformation (HCC) G93.5    Disorder of vulva N90.9    Female stress incontinence N39.3    HA (headache) R51    Hirsutism L68.0    Irregular periods N92.6    Menorrhagia N92.0    Metrorrhagia N92.1    Polyp of corpus uteri N84.0    Urge incontinence of urine N39.41    Vitamin D deficiency E55.9    Morbid obesity with BMI of 50.0-59.9, adult ([facility]) E66.01, Z68.43    Gastroesophageal reflux disease K21.9    Mild intermittent asthma without complication X06.59    Controlled type 2 diabetes mellitus without complication, without long-term current use of insulin ([facility]) E11.9         Current Outpatient Medications:     multivitamin (ONE A DAY) tablet, Take 1 Tab by mouth daily. , Disp: , Rfl:     aspirin delayed-release 81 mg tablet, Take 81 mg by mouth daily. , Disp: , Rfl:     metFORMIN ER (GLUCOPHAGE XR) 500 mg tablet, TAKE 1 TABLET BY MOUTH DAILY WITH DINNER, Disp: 90 Tab, Rfl: 4    amLODIPine-benazepril (LOTREL) 10-20 mg per capsule, Take 1 Cap by mouth daily. , Disp: 90 Cap, Rfl: 3    omeprazole (PRILOSEC) 40 mg capsule, TAKE 1 CAPSULE BY MOUTH DAILY, Disp: 90 Cap, Rfl: 3    albuterol (PROVENTIL HFA, VENTOLIN HFA, PROAIR HFA) 90 mcg/actuation inhaler, INHALE 2 PUFFS BY MOUTH EVERY 4 HOURS AS NEEDED FOR WHEEZING, Disp: 18 g, Rfl: 3    fluticasone propionate (FLONASE) 50 mcg/actuation nasal spray, SHAKE LQ AND U 1 SPR IEN QD, Disp: 1 Bottle, Rfl: 4    hydroCHLOROthiazide (HYDRODIURIL) 12.5 mg tablet, Take 1 Tab by mouth daily. , Disp: 90 Tab, Rfl: 3    norethindrone acetate (AYGESTIN) 5 mg tablet, TK 1 T PO BID, Disp: 60 Tab, Rfl: 0    Cholecalciferol, Vitamin D3, 50,000 unit cap, TK 1 C PO 1 TIME PER WEEK., Disp: , Rfl: 5     Allergies   Allergen Reactions    Simvastatin Palpitations         Review of Systems   Constitutional: Negative for fever and weight loss. Respiratory: Negative for cough and shortness of breath. Cardiovascular: Positive for palpitations ( Episodic palpitations, recently saw her cardiologist who indicated that he detected no evidence of significant cardiac pathology. ). Negative for chest pain. Gastrointestinal: Positive for abdominal pain ( Lower abdominal discomfort which seems to radiate from her back bilaterally).  Negative for blood in stool, constipation, diarrhea, heartburn, melena, nausea and vomiting. Genitourinary: Negative for dysuria, frequency, hematuria and urgency. Musculoskeletal: Positive for back pain. Neurological: Negative for tingling, sensory change and focal weakness. PHYSICAL EXAMINATION:    Constitutional: [x] Appears well-developed and well-nourished [x] No apparent distress        Mental status: [x] Alert and awake  [x] Oriented t [x] Able to follow commands      Eyes:   EOM    [x]  Normal        HENT: [x] Normocephalic,      Pulmonary/Chest: [x] Respiratory effort normal   [x] No visualized signs of difficulty breathing or respiratory distress           Musculoskeletal:   [x] No obvious deformities noted with regard to areas viewed           Neurological:        [x] No apparent neurologic deficits noted in areas viewed                 Skin:        [x] No apparent dermatologic abnormalities noted in areas viewed               Psychiatric:       [x] Normal Affect      Other pertinent observable physical exam findings:-None noted    Assessment & Plan:   Diagnoses and all orders for this visit:    1. Mechanical back pain    2. Lower abdominal pain       Reassured that based on her description of symptoms and treatment history there is a very low likelihood of urinary tract infection as the etiology for her symptoms. Her symptoms are also not consistent with cholecystitis or peptic ulcer disease. She is advised that very likely her back pain is mechanical in nature which could even explain the sensation of it radiating anteriorly. Patient states that she is sure this is not musculoskeletal because she \"knows her body\" and would like to have lab studies. She is advised that we are not seeing patients in the office or doing nonurgent lab studies here at this time.   Urgent care would be available to her but she is cautioned that I think it is unlikely that any significant findings will result from an evaluation there and also she runs the risk of being exposed to COVID-19. She is also advised that as soon as we are able to see patients here we can further evaluate her symptoms. I advised her to contact the office if her condition worsens, changes, fails to improve as anticipated and with any new problems. She expressed understanding with the diagnosis(es) and plan. This service was provided throughTelehealth, the patient is at home and the provider is at [facility] and Marie Bell LPN assisted with the telehealth visit. Pursuant to the emergency declaration under the [facility], [facility] waiver authority and the [employer] Act, this Virtual  Visit was conducted, with patient's consent, to reduce the patient's risk of exposure to COVID-19 and provide continuity of care for an established patient. Services were provided through a video synchronous discussion virtually to substitute for in-person clinic visit. Shantelle Ray MD         PLEASE NOTE:   This document has been produced using voice recognition software. Unrecognized errors in transcription may be present.

## 2020-04-23 NOTE — PATIENT INSTRUCTIONS
Reassured that based on her description of symptoms and treatment history there is a very low likelihood of urinary tract infection as the etiology for her symptoms. Her symptoms are also not consistent with cholecystitis or peptic ulcer disease. She is advised that very likely her back pain is mechanical in nature which could even explain the sensation of it radiating anteriorly. Patient states that she is sure this is not musculoskeletal because she \"knows her body\" and would like to have lab studies. She is advised that we are not seeing patients in the office or doing nonurgent lab studies here at this time. Urgent care would be available to her but she is cautioned that I think it is unlikely that any significant findings will result from an evaluation there and also she runs the risk of being exposed to COVID-19. She is also advised that as soon as we are able to see patients here we can further evaluate her symptoms. I advised her to contact the office if her condition worsens, changes, fails to improve as anticipated and with any new problems. She expressed understanding with the diagnosis(es) and plan.

## 2020-04-23 NOTE — PROGRESS NOTES
Najma Luciano is a 40 y.o. female (: 1975) presenting to address:    Chief Complaint   Patient presents with    LOW BACK PAIN     burning pain radiating from lower back to abdomen       Vitals:    20 1309   PainSc:   6   PainLoc: Back       Hearing/Vision:   No exam data present    Learning Assessment:     Learning Assessment 2020   PRIMARY LEARNER Patient   HIGHEST LEVEL OF EDUCATION - PRIMARY LEARNER  2 YEARS OF COLLEGE   BARRIERS PRIMARY LEARNER NONE   CO-LEARNER CAREGIVER No   PRIMARY LANGUAGE ENGLISH   LEARNER PREFERENCE PRIMARY OTHER (COMMENT)   ANSWERED BY patient   RELATIONSHIP SELF     Depression Screening:     3 most recent PHQ Screens 2020   Little interest or pleasure in doing things Not at all   Feeling down, depressed, irritable, or hopeless Not at all   Total Score PHQ 2 0     Fall Risk Assessment:   No flowsheet data found. Abuse Screening:   No flowsheet data found. Coordination of Care Questionaire:   1. Have you been to the ER, urgent care clinic since your last visit? Hospitalized since your last visit? NO    2. Have you seen or consulted any other health care providers outside of the [address] [place] [address] since your last visit? Include any pap smears or colon screening. YES, Cardiology w/Dr. Wilma Kim    Advanced Directive:   1. Do you have an Advanced Directive? NO    2. Would you like information on Advanced Directives?  NO

## 2020-04-27 PROBLEM — Z80.3 FAMILY HISTORY OF BREAST CANCER: Status: ACTIVE | Noted: 2020-04-16

## 2020-07-22 LAB — HBA1C MFR BLD HPLC: 5.7 %

## 2022-03-18 PROBLEM — N39.3 FEMALE STRESS INCONTINENCE: Status: ACTIVE | Noted: 2019-04-09

## 2022-03-18 PROBLEM — N92.1 METRORRHAGIA: Status: ACTIVE | Noted: 2019-04-09

## 2022-03-19 PROBLEM — L68.0 HIRSUTISM: Status: ACTIVE | Noted: 2019-03-19

## 2022-03-19 PROBLEM — E11.9 CONTROLLED TYPE 2 DIABETES MELLITUS WITHOUT COMPLICATION, WITHOUT LONG-TERM CURRENT USE OF INSULIN (HCC): Status: ACTIVE | Noted: 2020-01-22

## 2022-03-19 PROBLEM — N39.41 URGE INCONTINENCE OF URINE: Status: ACTIVE | Noted: 2019-04-09

## 2022-03-19 PROBLEM — E78.00 HYPERCHOLESTEROLEMIA: Status: ACTIVE | Noted: 2019-01-06

## 2022-03-19 PROBLEM — N92.6 IRREGULAR PERIODS: Status: ACTIVE | Noted: 2019-04-09

## 2022-03-19 PROBLEM — N92.0 MENORRHAGIA: Status: ACTIVE | Noted: 2019-04-09

## 2022-03-19 PROBLEM — N91.2 AMENORRHEA: Status: ACTIVE | Noted: 2019-04-09

## 2022-03-19 PROBLEM — N90.9 DISORDER OF VULVA: Status: ACTIVE | Noted: 2019-04-09

## 2022-03-19 PROBLEM — N63.0 BREAST LUMP: Status: ACTIVE | Noted: 2019-04-09

## 2022-03-19 PROBLEM — R73.03 PREDIABETES: Status: ACTIVE | Noted: 2019-01-08

## 2022-03-19 PROBLEM — D64.9 ANEMIA: Status: ACTIVE | Noted: 2019-04-09

## 2022-03-19 PROBLEM — K21.9 GASTROESOPHAGEAL REFLUX DISEASE: Status: ACTIVE | Noted: 2020-01-22

## 2022-03-19 PROBLEM — E66.01 MORBID OBESITY WITH BMI OF 50.0-59.9, ADULT (HCC): Status: ACTIVE | Noted: 2020-01-22

## 2022-03-19 PROBLEM — N84.0 POLYP OF CORPUS UTERI: Status: ACTIVE | Noted: 2019-04-09

## 2022-03-19 PROBLEM — Z80.3 FAMILY HISTORY OF BREAST CANCER: Status: ACTIVE | Noted: 2020-04-16

## 2022-03-20 PROBLEM — E66.01 OBESITY, MORBID, BMI 50 OR HIGHER (HCC): Status: ACTIVE | Noted: 2019-01-06

## 2022-03-20 PROBLEM — J45.20 MILD INTERMITTENT ASTHMA WITHOUT COMPLICATION: Status: ACTIVE | Noted: 2020-01-22

## 2024-05-14 ENCOUNTER — OFFICE VISIT (OUTPATIENT)
Age: 49
End: 2024-05-14
Payer: COMMERCIAL

## 2024-05-14 VITALS
SYSTOLIC BLOOD PRESSURE: 130 MMHG | BODY MASS INDEX: 50.02 KG/M2 | RESPIRATION RATE: 17 BRPM | HEIGHT: 64 IN | TEMPERATURE: 97.6 F | OXYGEN SATURATION: 99 % | HEART RATE: 98 BPM | WEIGHT: 293 LBS | DIASTOLIC BLOOD PRESSURE: 72 MMHG

## 2024-05-14 DIAGNOSIS — I20.89 ATYPICAL ANGINA (HCC): ICD-10-CM

## 2024-05-14 DIAGNOSIS — R06.02 EXERTIONAL SHORTNESS OF BREATH: Primary | ICD-10-CM

## 2024-05-14 DIAGNOSIS — G47.33 OBSTRUCTIVE SLEEP APNEA: ICD-10-CM

## 2024-05-14 DIAGNOSIS — R00.2 PALPITATIONS: ICD-10-CM

## 2024-05-14 DIAGNOSIS — I10 PRIMARY HYPERTENSION: ICD-10-CM

## 2024-05-14 DIAGNOSIS — R01.1 SYSTOLIC MURMUR: ICD-10-CM

## 2024-05-14 DIAGNOSIS — I51.89 DIASTOLIC DYSFUNCTION: ICD-10-CM

## 2024-05-14 DIAGNOSIS — E66.01 CLASS 3 SEVERE OBESITY DUE TO EXCESS CALORIES WITHOUT SERIOUS COMORBIDITY WITH BODY MASS INDEX (BMI) OF 50.0 TO 59.9 IN ADULT (HCC): ICD-10-CM

## 2024-05-14 PROCEDURE — 93000 ELECTROCARDIOGRAM COMPLETE: CPT | Performed by: INTERNAL MEDICINE

## 2024-05-14 PROCEDURE — 3075F SYST BP GE 130 - 139MM HG: CPT | Performed by: INTERNAL MEDICINE

## 2024-05-14 PROCEDURE — 3078F DIAST BP <80 MM HG: CPT | Performed by: INTERNAL MEDICINE

## 2024-05-14 PROCEDURE — 99204 OFFICE O/P NEW MOD 45 MIN: CPT | Performed by: INTERNAL MEDICINE

## 2024-05-14 RX ORDER — METFORMIN HYDROCHLORIDE 750 MG/1
1500 TABLET, EXTENDED RELEASE ORAL DAILY
COMMUNITY
Start: 2024-04-22

## 2024-05-14 RX ORDER — SEMAGLUTIDE 1.34 MG/ML
1 INJECTION, SOLUTION SUBCUTANEOUS
COMMUNITY
Start: 2022-03-10

## 2024-05-14 ASSESSMENT — ENCOUNTER SYMPTOMS
SHORTNESS OF BREATH: 1
ALLERGIC/IMMUNOLOGIC NEGATIVE: 1
EYES NEGATIVE: 1
GASTROINTESTINAL NEGATIVE: 1

## 2024-05-14 ASSESSMENT — LIFESTYLE VARIABLES
HOW OFTEN DO YOU HAVE A DRINK CONTAINING ALCOHOL: MONTHLY OR LESS
HOW MANY STANDARD DRINKS CONTAINING ALCOHOL DO YOU HAVE ON A TYPICAL DAY: 1 OR 2

## 2024-05-14 NOTE — PROGRESS NOTES
Fifi Galicia (:  1975) is a 48 y.o. female,New patient, here for evaluation of the following chief complaint(s):  New Patient (Re-establishing Care)    Subjective   SUBJECTIVE/OBJECTIVE:  HPI  Patient presents today for evaluation. She has a history of hypertension and diabetes. Patient has a history of sleep apnea, left untreated, and palpitations. She reports She has had problems with shortness of breath, palpitations and feeling that she is hearing her pulse in her ear. This has been on point now for the last several weeks.  Because of her diabetic history, there is still concern that She may have evidence of coronary artery disease. She had a stress test a few years ago which was normal. She does have significant cardiac risk as previously mentioned. Her shortness of breath is with activity. She cannot walk more than 20-30 yards before she has to stop to rest. She still has palpitations that feel like her heart races at times. She has been to the emergency room previously on several occasions and wonders whether or not her symptoms are more related to anxiety. She has hypertension and diabetes as previously mentioned. No orthopnea, or paroxysmal nocturnal dyspnea.  She does have documented sleep apnea but has not utilizing CPAP as directed.  Chest x-ray performed showed that her cardiac silhouette was enlarged and she came here with concern.  No history of thyroid disease or stroke. No history of tobacco use. She does have a family history of heart disease however. I was asked to evaluate her cardiac status and make recommendations.       I personally reviewed all available medical records, previous office notes, radiology reports and all available laboratory studies and procedural reports.    Past Medical History:   Diagnosis Date    Chest pain     Chiari malformation type I (HCC)     Cupping of optic disc     Diabetes (HCC)     Fibroids     Hematuria     Hypercholesterolemia     Hyperlipidemia

## 2024-05-14 NOTE — PROGRESS NOTES
1. \"Have you been to the ER, urgent care clinic since your last visit?  Hospitalized since your last visit?\" Reviewed by Dr. Efren Esquivel    2. \"Have you seen or consulted any other health care providers outside of the [facility] since your last visit?\" Reviewed by Dr. Efren Esquivel

## 2024-05-21 NOTE — PROGRESS NOTES
HISTORY OF PRESENT ILLNESS Den Elder is a 37 y.o. female. HPI Pt c/o cough, congestion, malaise, and body aches x 2 weeks. Pt states it started out more as sinus congestion but has now moved into his chest. Pt feels worse and states he felt chilled and feverish yesterday. Denies N/V/D, rash, otalgia, dizziness, HA, CP, SOB, and palpitations. Allergies Allergen Reactions  Simvastatin Palpitations Current Outpatient Medications Medication Sig  
 metFORMIN (GLUMETZA ER) 500 mg TG24 24 hour tablet Take  by mouth.  azithromycin (ZITHROMAX Z-AMIRAH) 250 mg tablet Take two tablets today then one tablet daily  albuterol (PROVENTIL HFA, VENTOLIN HFA, PROAIR HFA) 90 mcg/actuation inhaler Take 2 Puffs by inhalation every four (4) hours as needed for Wheezing.  promethazine-codeine (PHENERGAN WITH CODEINE) 6.25-10 mg/5 mL syrup Take 5 mL by mouth every six (6) hours as needed for Cough. Max Daily Amount: 20 mL.  amLODIPine-benazepril (LOTREL) 10-20 mg per capsule Take 1 Cap by mouth daily.  Cholecalciferol, Vitamin D3, 50,000 unit cap TK 1 C PO 1 TIME PER WEEK.  fluticasone (FLONASE) 50 mcg/actuation nasal spray SHAKE LQ AND U 1 SPR IEN QD  
 norethindrone acetate (AYGESTIN) 5 mg tablet TK 1 T PO BID  aspirin 81 mg tablet Take 81 mg by mouth.  famotidine (PEPCID) 20 mg tablet TK 1 T PO Q 12 H  
 metformin ER (GLUCOPHAGE XR) 750 mg tablet Take 500 mg by mouth daily. No current facility-administered medications for this visit. Review of Systems Constitutional: Positive for chills and malaise/fatigue. Negative for fever. HENT: Positive for congestion. Negative for ear pain and sore throat. Respiratory: Positive for cough and wheezing. Negative for sputum production and shortness of breath. Cardiovascular: Negative. Negative for chest pain, palpitations and leg swelling. Gastrointestinal: Negative for abdominal pain, nausea and vomiting. Genitourinary: Negative. Musculoskeletal: Positive for myalgias. Negative for back pain, joint pain and neck pain. Skin: Negative. Negative for itching and rash. Neurological: Negative. Negative for headaches. Psychiatric/Behavioral: Negative. Visit Vitals /81 (BP 1 Location: Left arm, BP Patient Position: Sitting) Pulse 98 Temp 98.8 °F (37.1 °C) (Oral) Resp 18 Ht 5' 4\" (1.626 m) Wt 312 lb (141.5 kg) SpO2 95% BMI 53.55 kg/m² Physical Exam  
Constitutional: She is oriented to person, place, and time. She appears well-developed and well-nourished. No distress. HENT:  
Head: Normocephalic and atraumatic. Right Ear: Tympanic membrane, external ear and ear canal normal.  
Left Ear: Tympanic membrane, external ear and ear canal normal.  
Nose: Mucosal edema and rhinorrhea present. Right sinus exhibits no maxillary sinus tenderness and no frontal sinus tenderness. Left sinus exhibits no maxillary sinus tenderness and no frontal sinus tenderness. Mouth/Throat: Uvula is midline, oropharynx is clear and moist and mucous membranes are normal. No oropharyngeal exudate, posterior oropharyngeal edema or posterior oropharyngeal erythema. Cardiovascular: Normal rate, regular rhythm, normal heart sounds and intact distal pulses. Exam reveals no gallop and no friction rub. No murmur heard. Pulmonary/Chest: Effort normal. No respiratory distress. She has wheezes. She has no rales. Lymphadenopathy:  
  She has no cervical adenopathy. Neurological: She is alert and oriented to person, place, and time. Skin: Skin is warm and dry. She is not diaphoretic. Psychiatric: She has a normal mood and affect. Her behavior is normal.  
 
 
ASSESSMENT and PLAN 
  ICD-10-CM ICD-9-CM 1. Bronchitis J40 490 azithromycin (ZITHROMAX Z-AMIRAH) 250 mg tablet  
   albuterol (PROVENTIL HFA, VENTOLIN HFA, PROAIR HFA) 90 mcg/actuation inhaler promethazine-codeine (PHENERGAN WITH CODEINE) 6.25-10 mg/5 mL syrup Follow-up Disposition: 
Return if symptoms worsen or fail to improve. Pt expressed understanding of visit summary and plans for any follow ups or referrals as well as any medications prescribed. yes

## 2025-03-28 ENCOUNTER — TELEPHONE (OUTPATIENT)
Age: 50
End: 2025-03-28